# Patient Record
Sex: MALE | Race: WHITE | Employment: FULL TIME | URBAN - METROPOLITAN AREA
[De-identification: names, ages, dates, MRNs, and addresses within clinical notes are randomized per-mention and may not be internally consistent; named-entity substitution may affect disease eponyms.]

---

## 2018-10-09 PROBLEM — E66.01 SEVERE OBESITY (HCC): Status: ACTIVE | Noted: 2018-10-09

## 2021-05-05 PROBLEM — R80.9 MICROALBUMINURIA: Status: ACTIVE | Noted: 2021-05-05

## 2021-11-08 ENCOUNTER — TELEPHONE (OUTPATIENT)
Dept: DIABETES SERVICES | Age: 56
End: 2021-11-08

## 2021-11-18 ENCOUNTER — TELEPHONE (OUTPATIENT)
Dept: DIABETES SERVICES | Age: 56
End: 2021-11-18

## 2021-11-18 NOTE — TELEPHONE ENCOUNTER
Call to patient about Diabetes Education referral.  Private Insurance. Left message to let us know if  Interested, and it is okay we verify insurance and call back with the results.

## 2021-11-18 NOTE — TELEPHONE ENCOUNTER
Patient called back and left a message with Mitchel Cardozo that it is okay to verify insurance. He is interested in Diabetes Education and to call back with the coverage.

## 2021-11-23 ENCOUNTER — TELEPHONE (OUTPATIENT)
Dept: DIABETES SERVICES | Age: 56
End: 2021-11-23

## 2021-11-23 NOTE — TELEPHONE ENCOUNTER
Received insurance verification form Diabetes Education. Called to discuss with patient. Left message to please call us back about Diabetes Education.

## 2021-11-29 ENCOUNTER — TELEPHONE (OUTPATIENT)
Dept: DIABETES SERVICES | Age: 56
End: 2021-11-29

## 2021-11-29 NOTE — TELEPHONE ENCOUNTER
He is interested. He has no time this year for education. He works for [employer].   Okay to verify insurance again in 2022 and call him back in January 2022

## 2022-01-07 ENCOUNTER — TELEPHONE (OUTPATIENT)
Dept: DIABETES SERVICES | Age: 57
End: 2022-01-07

## 2022-01-13 ENCOUNTER — TELEPHONE (OUTPATIENT)
Dept: DIABETES SERVICES | Age: 57
End: 2022-01-13

## 2022-01-13 NOTE — TELEPHONE ENCOUNTER
Second phone call after receiving insurance info for diabetes education. Left message for pt to return call.

## 2022-01-17 ENCOUNTER — TELEPHONE (OUTPATIENT)
Dept: DIABETES SERVICES | Age: 57
End: 2022-01-17

## 2022-01-17 NOTE — TELEPHONE ENCOUNTER
Third attempt to contact pt regarding diabetes referral. Left message asking for a return call. If we don't hear from pt by 1/25/22 we will send a letter. If we don't hear by 2/28/22 we will no longer pursue.

## 2022-03-01 ENCOUNTER — DOCUMENTATION ONLY (OUTPATIENT)
Dept: DIABETES SERVICES | Age: 57
End: 2022-03-01

## 2022-03-01 NOTE — PROGRESS NOTES
Letter sent about Diabetes education on 1-. No response to letter about education. .  Will close Diabetes file and notify referring.

## 2022-03-18 PROBLEM — R80.9 MICROALBUMINURIA: Status: ACTIVE | Noted: 2021-05-05

## 2022-03-19 PROBLEM — E66.01 SEVERE OBESITY (HCC): Status: ACTIVE | Noted: 2018-10-09

## 2022-06-06 ENCOUNTER — OFFICE VISIT (OUTPATIENT)
Dept: ENDOCRINOLOGY | Age: 57
End: 2022-06-06
Payer: COMMERCIAL

## 2022-06-06 VITALS
HEIGHT: 67 IN | DIASTOLIC BLOOD PRESSURE: 80 MMHG | HEART RATE: 78 BPM | WEIGHT: 223.3 LBS | BODY MASS INDEX: 35.05 KG/M2 | OXYGEN SATURATION: 97 % | SYSTOLIC BLOOD PRESSURE: 140 MMHG | TEMPERATURE: 98 F

## 2022-06-06 DIAGNOSIS — E11.42 DIABETIC POLYNEUROPATHY ASSOCIATED WITH TYPE 2 DIABETES MELLITUS (HCC): ICD-10-CM

## 2022-06-06 DIAGNOSIS — E08.319 DIABETIC RETINOPATHY OF BOTH EYES ASSOCIATED WITH DIABETES MELLITUS DUE TO UNDERLYING CONDITION, MACULAR EDEMA PRESENCE UNSPECIFIED, UNSPECIFIED RETINOPATHY SEVERITY (HCC): ICD-10-CM

## 2022-06-06 DIAGNOSIS — Z79.4 TYPE 2 DIABETES MELLITUS WITH HYPERGLYCEMIA, WITH LONG-TERM CURRENT USE OF INSULIN (HCC): Primary | ICD-10-CM

## 2022-06-06 DIAGNOSIS — E78.00 HYPERCHOLESTEROLEMIA: ICD-10-CM

## 2022-06-06 DIAGNOSIS — E11.65 TYPE 2 DIABETES MELLITUS WITH HYPERGLYCEMIA, WITH LONG-TERM CURRENT USE OF INSULIN (HCC): Primary | ICD-10-CM

## 2022-06-06 DIAGNOSIS — I10 ESSENTIAL HYPERTENSION: ICD-10-CM

## 2022-06-06 PROCEDURE — 3017F COLORECTAL CA SCREEN DOC REV: CPT | Performed by: DIETITIAN, REGISTERED

## 2022-06-06 PROCEDURE — 1036F TOBACCO NON-USER: CPT | Performed by: DIETITIAN, REGISTERED

## 2022-06-06 PROCEDURE — G8427 DOCREV CUR MEDS BY ELIG CLIN: HCPCS | Performed by: DIETITIAN, REGISTERED

## 2022-06-06 PROCEDURE — 3046F HEMOGLOBIN A1C LEVEL >9.0%: CPT | Performed by: DIETITIAN, REGISTERED

## 2022-06-06 PROCEDURE — G8417 CALC BMI ABV UP PARAM F/U: HCPCS | Performed by: DIETITIAN, REGISTERED

## 2022-06-06 PROCEDURE — 99205 OFFICE O/P NEW HI 60 MIN: CPT | Performed by: DIETITIAN, REGISTERED

## 2022-06-06 PROCEDURE — 2022F DILAT RTA XM EVC RTNOPTHY: CPT | Performed by: DIETITIAN, REGISTERED

## 2022-06-06 RX ORDER — EMPAGLIFLOZIN AND METFORMIN HYDROCHLORIDE 12.5; 1 MG/1; MG/1
2 TABLET ORAL EVERY MORNING
Qty: 60 TABLET | Refills: 5 | Status: SHIPPED | OUTPATIENT
Start: 2022-06-06 | End: 2022-06-06 | Stop reason: ALTCHOICE

## 2022-06-06 RX ORDER — PEN NEEDLE, DIABETIC 32GX 5/32"
1 NEEDLE, DISPOSABLE MISCELLANEOUS DAILY
Qty: 100 EACH | Refills: 3 | Status: SHIPPED | OUTPATIENT
Start: 2022-06-06 | End: 2022-08-04 | Stop reason: SDUPTHER

## 2022-06-06 RX ORDER — INSULIN GLARGINE 300 U/ML
50 INJECTION, SOLUTION SUBCUTANEOUS EVERY MORNING
Qty: 69 ML | Refills: 5 | Status: SHIPPED | OUTPATIENT
Start: 2022-06-06 | End: 2022-08-04 | Stop reason: SDUPTHER

## 2022-06-06 RX ORDER — DULAGLUTIDE 1.5 MG/.5ML
1.5 INJECTION, SOLUTION SUBCUTANEOUS WEEKLY
Qty: 2 ML | Refills: 3 | Status: SHIPPED | OUTPATIENT
Start: 2022-06-06 | End: 2022-08-04 | Stop reason: ALTCHOICE

## 2022-06-06 RX ORDER — INSULIN LISPRO 100 [IU]/ML
5 INJECTION, SOLUTION INTRAVENOUS; SUBCUTANEOUS 3 TIMES DAILY
Qty: 45 ML | Refills: 5 | Status: SHIPPED | OUTPATIENT
Start: 2022-06-06 | End: 2022-08-04 | Stop reason: SDUPTHER

## 2022-06-06 RX ORDER — INSULIN ASPART 100 [IU]/ML
40 INJECTION, SUSPENSION SUBCUTANEOUS EVERY MORNING
COMMUNITY
End: 2022-06-06 | Stop reason: ALTCHOICE

## 2022-06-06 RX ORDER — EMPAGLIFLOZIN, METFORMIN HYDROCHLORIDE 12.5; 1 MG/1; MG/1
2 TABLET, EXTENDED RELEASE ORAL EVERY MORNING
Qty: 180 TABLET | Refills: 5 | Status: SHIPPED | OUTPATIENT
Start: 2022-06-06 | End: 2022-08-04 | Stop reason: SDUPTHER

## 2022-06-06 RX ORDER — INSULIN GLARGINE 300 U/ML
70 INJECTION, SOLUTION SUBCUTANEOUS EVERY MORNING
COMMUNITY
End: 2022-06-06 | Stop reason: SDUPTHER

## 2022-06-06 RX ORDER — BLOOD SUGAR DIAGNOSTIC
1 STRIP MISCELLANEOUS DAILY
Qty: 200 EACH | Refills: 3 | Status: SHIPPED | OUTPATIENT
Start: 2022-06-06 | End: 2022-08-04 | Stop reason: SDUPTHER

## 2022-06-06 RX ORDER — GLUCAGON INJECTION, SOLUTION 1 MG/.2ML
1 INJECTION, SOLUTION SUBCUTANEOUS AS NEEDED
Qty: 0.4 ML | Refills: 11 | Status: SHIPPED | OUTPATIENT
Start: 2022-06-06

## 2022-06-06 ASSESSMENT — ENCOUNTER SYMPTOMS
ABDOMINAL PAIN: 0
TROUBLE SWALLOWING: 0
VOMITING: 0
CONSTIPATION: 0
NAUSEA: 0
BACK PAIN: 0
COUGH: 0
VOICE CHANGE: 0
SHORTNESS OF BREATH: 0
DIARRHEA: 0
WHEEZING: 0

## 2022-06-06 NOTE — PATIENT INSTRUCTIONS
INSULIN ADMINISTRATION INSTRUCTIONS:    Patient Name:  Ryanne Cooper   YOB: 1965    Provider Name:  RONALD Cruz NP  Today's Date:  06/06/22      Check Blood Sugars four times daily before all meals and bedtime. Trulicity 1.5 mg weekly. LONG-ACTING INSULIN     Toujeo u300 - 50 units daily every morning    The purpose of long-acting insulin is to try to achieve fasting (first thing in the morning) blood glucose in the morning . If fasting blood glucose is persistently above 125, increase the long-acting insulin dose by 2 units every 3 days until fasting blood glucose is persistently . If fasting blood glucose is persistently less than 80, decrease the long-acting insulin dose by 2 units every 3 days until fasting blood glucose is persistently . Leave the dose at whichever dose it takes to keep fasting blood glucose . RAPID-ACTING INSULIN     Humalog 5 units 3 times daily 1st bite. Add correction scale of 1 unit for every 25 points above 150 mg/dL. Be sure to check blood glucose before meals and at bedtime. Based on the blood glucose reading, take the following amount of insulin:      If there are questions, send an electronic [employer] message to GAVIOTA Raymond (for nonurgent questions) or call the office at [phone].     [facility] ENDOCRINOLOGY  [address] [id number]

## 2022-06-06 NOTE — PROGRESS NOTES
[facility] ENDOCRINOLOGY   AND   THYROID [facility]ODULE CL[facility]IC    GAVIOTA Evangelista   [address], [address] [facility]  [place], [place]  Phone [phone]  Facsimile [phone]        Reason for visit:     Catie GALLARDO (1965) is here for new evaluation and treatment of Type 2 Diabetes Mellitus, referred by Liam Maldonado MD, PCP. ASSESSMENT AND PLAN:    1. Type 2 diabetes mellitus with hyperglycemia, with long-term current use of insulin ([facility])  A1c is 7.1%. Glycemic control is suboptimal.  Patient admits sometimes he does not take his insulin. Patient is actually taking Toujeo 50 units once daily. Patient did not bring his glucose readings to the visit today. Patient leads a busy [place] as a [address] [facility], [address] . Patient may benefit from GLP-1 RA to discuss at future visit - with stabilization of retinopathy. --- Stop Novolog 70/30. --- Stop Invokamet. --- Stop Bydureon. --- Switch from invokamet to Synjardy XR 12.5- 1000 mg 2 tablets daily every morning. --- Continue Toujeo 50 units every morning with instructions to titrate by 2 units every 3 days to FBG  mg/dL. --- Start Humalog 5 units AC TID with correction 1 for 25 above 150 mg/dL. --- Start Trulicity 1.5 mg weekly  --- Ordered labs  --- Instructed patient to bring glucose readings next visit to qualify for CGM technology. Most recent renal function is stable. We will repeat CMP at future visit. Patient denies history of diabetic ketoacidosis. We discussed increased risk of urinary tract and genital mycotic infections as well as basic hygiene that may help to prevent infection. Instructed on importance of hydration to prevent DKA. Instructed patient to check blood sugars 4 times daily before meals and at bedtime. Instructed patient on the 15/15 rule for hypoglycemic management. Instructed patient to keep glucose tabs on hand at all times. Instructed patient to use glucagon for blood sugars less than 50 mg/dL. Instructed patient to contact the office if having consistent lows below 70 mg/dL or consistent highs greater than 250 mg/dL. Instructed patient to follow a carb controlled diet. Referred patient to diabetes education. Instructed patient on the importance of at least 20 minutes of daily physical activity such as walking. Instructed patient on the importance of diabetic foot wear and daily foot care. Repeat labs prior to next visit. Follow-up in 1 months.    - TOUJEO SOLOSTAR 300 UNIT/ML SOPN; Inject 50 Units into the skin every morning Titrate by 2 units every 3 days to fasting BG goal of  mg/dL. Max Daily dose 70 units. E11.65. Dispense: 69 mL; Refill: 5  - HUMALOG KWIKPEN 100 UNIT/ML SOPN; Inject 5 Units into the skin 3 times daily Plus correction of 1 for 25 above 150 mg/dL. Max Daily Dose: 45 units. E11.65  Dispense: 45 mL; Refill: 5  - BD PEN NEEDLE INDRA U/F 32G X 4 MM MISC; 1 each by Does not apply route daily  Dispense: 100 each; Refill: 3  - GVOKE HYPOPEN 2-PACK 1 MG/0.2ML SOAJ; Inject 1 mg into the skin as needed (as needed for BG less than 50 mg/dL)  Dispense: 0.4 mL; Refill: 11  - ONETOUCH ULTRA strip; 1 each by In Vitro route daily Check bG 4 times daily E11.65  Dispense: 200 each; Refill: 3  - Comprehensive Metabolic Panel; Future  - C-Peptide; Future  - Zinc Transporter 8 AB; Future  - IA-2 Antibody; Future  - Glutamic Acid Decarboxylase; Future  - SYNJARDY XR 12.5-1000 MG TB24; Take 2 tablets by mouth every morning  Dispense: 180 tablet; Refill: 5  - TRULICITY 1.5 DU/5.9LO SOPN; Inject 1.5 mg into the skin once a week  Dispense: 2 mL; Refill: 3    2. Hypercholesterolemia  Last LDL 42 at goal of less than 70 on rosuvastatin 20 mg daily. 3. Essential hypertension  BP elevated. Patient reports he has white coat syndrome. Patient is taking     4.  Diabetic retinopathy of both eyes associated with diabetes mellitus due to underlying condition, macular edema presence unspecified, unspecified retinopathy severity ([facility]  --- Eye exam is up to date. --- patient may qualify for GLP-1 RA therapy, future discussion. 5. Diabetic polyneuropathy associated with type 2 diabetes mellitus ([facility]  --- Plan to discuss future pharmacotherapy options at next visit. --- Instructed on importance of daily foot care and foot wear. Follow-up and Dispositions    · Return in about 1 month (around 7/6/2022). Tests or Results Reviewed: (see lab dates below in note) HgbA1C, Cr, GFR, Microalbumin/Cr ratio, Lipid Profile, TSH. History of Present Illness:    History given by patient. 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 Date of DM diagnosis: age 37, year 2004      Current Diabetes Medications: Invokamet 150-1000 mg 2 tablets daily with breakfast, Novolog 70/30  Mix 40 units in the morning and 20 units afternoon, Toujeo 70 units in the morning,  Bydureon - 1 year. Medication Failures: metformin - did not help alone. No history of DKA, pancreatitis, and gastroparesis. DFW - left foot center from staph infection and plantar's wart  - now resolved    Current symptoms: See Review of Systems    Nephropathy: Stage 1 Kidney Disease  03/21/2022 Cr 0.69, , microalbumin/Cr ratio <30    Hemoglobin A1c:  07/09/2019 9.9%  10/08/2019  9.5%  06/09/2020 9.6%  01/07/2021 9.3%  04/28/2021 9.9%  10/27/2021 10%  03/21/2022 10.9%    Lipids:    03/21/2022 TC- 152, LDL- 42, VLDL- 63,  HDL- 47, TG- 439    Thyroid:   03/21/2022 TSH 2.830    Other Labs:    Home blood glucose monitoring frequency: 1 times per day    Blood glucose: by glucometer   fasting 135-180   AC lunch none,    AC supper none,    bedtime none     Hypoglycemia: only if not eating for an extended amount of time. Neuropathy: tingling in feet    Retinopathy: Last eye exam was April 2022 and demonstrated both eyes diabetic retinopathy. Eye care specialist is [facility].     Diet:   Breakfast Oat grain cereal  Lunch: turkey sandwich or hamburger/hot dog  Dinner: hamburger meat or chicken  Drink; water, diet cokes, coffee  ETOH: beer - 6 a month or 1-2 per week. Exercise: gym in neighborhood 2 times a week. Diabetes education: The patient has not received formal diabetes education. Patient has been referred to dietitian and nurse help line -     BP:  BP Readings from Last 3 Encounters:   06/06/22 (!) 140/80   04/04/22 134/68   03/21/22 120/70      Weight Trends: Wt Readings from Last 3 Encounters:   06/06/22 223 lb 4.8 oz (101.3 kg)   04/04/22 229 lb 9.6 oz (104.1 kg)   03/21/22 226 lb (102.5 kg)      Medical/Surgical/Social/Family History: Reviewed in Chart    Medications: Reviewed in chart    Allergies  Erythromycin    Review of Systems   Constitutional: Negative for appetite change, diaphoresis, fatigue and unexpected weight change. HENT: Positive for postnasal drip (causes nausea/ gagging). Negative for trouble swallowing and voice change. Eyes: Positive for visual disturbance (s/p laser surgery in both eyes. Left eye - detached retina - in high school. s/p Left eye cataract removal. Floater in left eye). Respiratory: Negative for cough, shortness of breath and wheezing. Cardiovascular: Negative for chest pain, palpitations and leg swelling. Gastrointestinal: Negative for abdominal pain, constipation, diarrhea, nausea and vomiting. Endocrine: Negative for cold intolerance, heat intolerance, polydipsia, polyphagia and polyuria. Genitourinary: Negative for difficulty urinating and frequency. Musculoskeletal: Positive for arthralgias (s/p broken bones on left side - previously raced motorcross. ) and myalgias (pulled a tendon in left arm). Negative for back pain. Skin: Negative for pallor. Neurological: Positive for numbness (tingling in feet and cold feet at night). Negative for dizziness, tremors, weakness and headaches. Hematological: Negative for adenopathy. Psychiatric/Behavioral: Negative for dysphoric mood and sleep disturbance (wake 1 time to urinate). The patient is not nervous/anxious. BP (!) 140/80   Pulse 78   Temp 98 °F (36.7 °C) (Tympanic)   Ht 5' 7\" (1.702 m)   Wt 223 lb 4.8 oz (101.3 kg)   SpO2 97%   BMI 34.97 kg/m²     Physical Exam  Constitutional:       General: He is not in acute distress. Appearance: Normal appearance. He is normal weight. He is not ill-appearing. HENT:      Head: Normocephalic. Cardiovascular:      Rate and Rhythm: Normal rate and regular rhythm. Pulses: Normal pulses. Pulmonary:      Effort: No respiratory distress. Breath sounds: Normal breath sounds. No wheezing or rhonchi. Chest:      Chest wall: No tenderness. Abdominal:      General: There is no distension. Palpations: Abdomen is soft. Tenderness: There is no abdominal tenderness. There is no guarding. Musculoskeletal:         General: No swelling, tenderness or signs of injury. Cervical back: Neck supple. No tenderness. Right lower leg: No edema. Left lower leg: No edema. Feet:      Right foot:      Protective Sensation: 10 sites tested. 10 sites sensed. Skin integrity: Dry skin present. No ulcer. Toenail Condition: Right toenails are normal.      Left foot:      Protective Sensation: 10 sites tested. 10 sites sensed. Skin integrity: Dry skin present. No ulcer. Toenail Condition: Left toenails are normal.   Lymphadenopathy:      Cervical: No cervical adenopathy. Skin:     General: Skin is warm and dry. Findings: No erythema or rash. Neurological:      Mental Status: He is alert. Motor: No weakness.    Psychiatric:         Mood and Affect: Mood normal.         Behavior: Behavior normal.         Orders Placed This Encounter   Procedures    Comprehensive Metabolic Panel     Standing Status:   Future     Standing Expiration Date:   6/6/2023    C-Peptide     Standing Status: Future     Standing Expiration Date:   6/6/2023    Zinc Transporter 8 AB     Standing Status:   Future     Standing Expiration Date:   6/6/2023    IA-2 Antibody     Standing Status:   Future     Standing Expiration Date:   6/6/2023    Glutamic Acid Decarboxylase     Standing Status:   Future     Standing Expiration Date:   6/6/2023       Current Outpatient Medications   Medication Sig Dispense Refill    TOUJEO SOLOSTAR 300 UNIT/ML SOPN Inject 50 Units into the skin every morning Titrate by 2 units every 3 days to fasting BG goal of  mg/dL. Max Daily dose 70 units. E11.65. 69 mL 5    HUMALOG KWIKPEN 100 UNIT/ML SOPN Inject 5 Units into the skin 3 times daily Plus correction of 1 for 25 above 150 mg/dL. Max Daily Dose: 45 units. E11.65 45 mL 5    BD PEN NEEDLE INDRA U/F 32G X 4 MM MISC 1 each by Does not apply route daily 100 each 3    GVOKE HYPOPEN 2-PACK 1 MG/0.2ML SOAJ Inject 1 mg into the skin as needed (as needed for BG less than 50 mg/dL) 0.4 mL 11    ONETOUCH ULTRA strip 1 each by In Vitro route daily Check bG 4 times daily E11.65 200 each 3    SYNJARDY XR 12.5-1000 MG TB24 Take 2 tablets by mouth every morning 180 tablet 5    Lancets MISC Check glucose once daily. DX: E11.65      amLODIPine-benazepril (LOTREL) 10-20 MG per capsule Take 1 capsule by mouth daily      vitamin D (CHOLECALCIFEROL) 25 MCG (1000 UT) TABS tablet Take by mouth daily      hydroCHLOROthiazide (HYDRODIURIL) 12.5 MG tablet Take 12.5 mg by mouth daily      levothyroxine (SYNTHROID) 200 MCG tablet Take 200 mcg by mouth every morning (before breakfast)      rosuvastatin (CRESTOR) 20 MG tablet Take 20 mg by mouth      sildenafil (VIAGRA) 100 MG tablet Take 100 mg by mouth as needed       No current facility-administered medications for this visit. GAVIOTA Whitehead          Portions of this note were generated with the assistance of voice recognition software.   As such, some errors in transcription may be present.

## 2022-06-30 DIAGNOSIS — E11.65 TYPE 2 DIABETES MELLITUS WITH HYPERGLYCEMIA, WITH LONG-TERM CURRENT USE OF INSULIN (HCC): ICD-10-CM

## 2022-06-30 DIAGNOSIS — Z79.4 TYPE 2 DIABETES MELLITUS WITH HYPERGLYCEMIA, WITH LONG-TERM CURRENT USE OF INSULIN (HCC): ICD-10-CM

## 2022-07-01 LAB
ALBUMIN SERPL-MCNC: 4.3 G/DL (ref 3.5–5)
ALBUMIN/GLOB SERPL: 1.3 {RATIO} (ref 1.2–3.5)
ALP SERPL-CCNC: 67 U/L (ref 50–136)
ALT SERPL-CCNC: 30 U/L (ref 12–65)
ANION GAP SERPL CALC-SCNC: 6 MMOL/L (ref 7–16)
AST SERPL-CCNC: 12 U/L (ref 15–37)
BILIRUB SERPL-MCNC: 0.3 MG/DL (ref 0.2–1.1)
BUN SERPL-MCNC: 18 MG/DL (ref 6–23)
CALCIUM SERPL-MCNC: 9.3 MG/DL (ref 8.3–10.4)
CHLORIDE SERPL-SCNC: 108 MMOL/L (ref 98–107)
CO2 SERPL-SCNC: 25 MMOL/L (ref 21–32)
CREAT SERPL-MCNC: 0.7 MG/DL (ref 0.8–1.5)
GLOBULIN SER CALC-MCNC: 3.2 G/DL (ref 2.3–3.5)
GLUCOSE SERPL-MCNC: 108 MG/DL (ref 65–100)
POTASSIUM SERPL-SCNC: 4.1 MMOL/L (ref 3.5–5.1)
PROT SERPL-MCNC: 7.5 G/DL (ref 6.3–8.2)
SODIUM SERPL-SCNC: 139 MMOL/L (ref 136–145)

## 2022-07-02 LAB
C PEPTIDE SERPL-MCNC: 1.3 NG/ML (ref 1.1–4.4)
GAD65 AB SER-ACNC: <5 U/ML (ref 0–5)

## 2022-07-08 ENCOUNTER — TELEPHONE (OUTPATIENT)
Dept: ENDOCRINOLOGY | Age: 57
End: 2022-07-08

## 2022-07-08 NOTE — TELEPHONE ENCOUNTER
Pt brought a letter into the office where his pharmacy advised him Gvoke and Synjardy XR will arrive shortly. But the pharmacy stated they have canceled his Invokamet Rx. Pharmacy also stated if pt is not aware of this change or if Prescriber wishes for pt to continue both Synjardy XR and Invokamet, Please call [phone]. After reviewing pt's last Chart note, Invokamet was discontinued. Pharmacy also stated they needed more information about pt's pen needle Rx. Called Pharmacy-spoke toThao, Advised her of the [employer] a verbal for a 90 day supply on the Trulicity, and Pen needles where to be taking 4 times daily, so a verbal change was made there as well. Ha Gutierrez had not more questions and stated pt will get his mail order next week.

## 2022-07-09 LAB — ZNT8 AB: <15 U/ML

## 2022-07-10 LAB — ISLET CELL512 AB SER-ACNC: <7.5 U/ML

## 2022-08-04 ENCOUNTER — OFFICE VISIT (OUTPATIENT)
Dept: ENDOCRINOLOGY | Age: 57
End: 2022-08-04
Payer: COMMERCIAL

## 2022-08-04 VITALS
HEART RATE: 76 BPM | WEIGHT: 227.6 LBS | TEMPERATURE: 98.2 F | DIASTOLIC BLOOD PRESSURE: 82 MMHG | SYSTOLIC BLOOD PRESSURE: 132 MMHG | BODY MASS INDEX: 35.72 KG/M2 | HEIGHT: 67 IN | OXYGEN SATURATION: 100 %

## 2022-08-04 DIAGNOSIS — E11.65 TYPE 2 DIABETES MELLITUS WITH HYPERGLYCEMIA, WITH LONG-TERM CURRENT USE OF INSULIN (HCC): Primary | ICD-10-CM

## 2022-08-04 DIAGNOSIS — Z79.4 TYPE 2 DIABETES MELLITUS WITH HYPERGLYCEMIA, WITH LONG-TERM CURRENT USE OF INSULIN (HCC): Primary | ICD-10-CM

## 2022-08-04 PROCEDURE — 1036F TOBACCO NON-USER: CPT | Performed by: DIETITIAN, REGISTERED

## 2022-08-04 PROCEDURE — G8427 DOCREV CUR MEDS BY ELIG CLIN: HCPCS | Performed by: DIETITIAN, REGISTERED

## 2022-08-04 PROCEDURE — 2022F DILAT RTA XM EVC RTNOPTHY: CPT | Performed by: DIETITIAN, REGISTERED

## 2022-08-04 PROCEDURE — 3046F HEMOGLOBIN A1C LEVEL >9.0%: CPT | Performed by: DIETITIAN, REGISTERED

## 2022-08-04 PROCEDURE — 3017F COLORECTAL CA SCREEN DOC REV: CPT | Performed by: DIETITIAN, REGISTERED

## 2022-08-04 PROCEDURE — 99215 OFFICE O/P EST HI 40 MIN: CPT | Performed by: DIETITIAN, REGISTERED

## 2022-08-04 PROCEDURE — G8417 CALC BMI ABV UP PARAM F/U: HCPCS | Performed by: DIETITIAN, REGISTERED

## 2022-08-04 RX ORDER — TIRZEPATIDE 2.5 MG/.5ML
2.5 INJECTION, SOLUTION SUBCUTANEOUS WEEKLY
Qty: 2 ML | Refills: 1
Start: 2022-08-04

## 2022-08-04 RX ORDER — CANAGLIFLOZIN AND METFORMIN HYDROCHLORIDE 150; 1000 MG/1; MG/1
TABLET, FILM COATED ORAL
COMMUNITY
Start: 2022-06-21 | End: 2022-08-04 | Stop reason: ALTCHOICE

## 2022-08-04 RX ORDER — PEN NEEDLE, DIABETIC 32GX 5/32"
1 NEEDLE, DISPOSABLE MISCELLANEOUS DAILY
Qty: 100 EACH | Refills: 3 | Status: SHIPPED | OUTPATIENT
Start: 2022-08-04

## 2022-08-04 RX ORDER — BLOOD-GLUCOSE SENSOR
EACH MISCELLANEOUS
Qty: 9 EACH | Refills: 3 | Status: SHIPPED | OUTPATIENT
Start: 2022-08-04

## 2022-08-04 RX ORDER — BLOOD SUGAR DIAGNOSTIC
1 STRIP MISCELLANEOUS DAILY
Qty: 200 EACH | Refills: 3 | Status: SHIPPED | OUTPATIENT
Start: 2022-08-04

## 2022-08-04 RX ORDER — EMPAGLIFLOZIN, METFORMIN HYDROCHLORIDE 12.5; 1 MG/1; MG/1
2 TABLET, EXTENDED RELEASE ORAL EVERY MORNING
Qty: 180 TABLET | Refills: 5 | Status: SHIPPED | OUTPATIENT
Start: 2022-08-04

## 2022-08-04 RX ORDER — INSULIN LISPRO 100 [IU]/ML
8 INJECTION, SOLUTION INTRAVENOUS; SUBCUTANEOUS 3 TIMES DAILY
Qty: 45 ML | Refills: 5 | Status: SHIPPED | OUTPATIENT
Start: 2022-08-04

## 2022-08-04 RX ORDER — INSULIN GLARGINE 300 U/ML
60 INJECTION, SOLUTION SUBCUTANEOUS EVERY MORNING
Qty: 69 ML | Refills: 5 | Status: SHIPPED | OUTPATIENT
Start: 2022-08-04

## 2022-08-04 RX ORDER — LANCETS 30 GAUGE
EACH MISCELLANEOUS
Qty: 400 EACH | Refills: 3 | Status: SHIPPED | OUTPATIENT
Start: 2022-08-04

## 2022-08-04 RX ORDER — BLOOD-GLUCOSE TRANSMITTER
EACH MISCELLANEOUS
Qty: 1 EACH | Refills: 3 | Status: SHIPPED | OUTPATIENT
Start: 2022-08-04

## 2022-08-04 ASSESSMENT — ENCOUNTER SYMPTOMS
VOMITING: 0
TROUBLE SWALLOWING: 0
NAUSEA: 0
WHEEZING: 0
BACK PAIN: 0
DIARRHEA: 0
COUGH: 0
ABDOMINAL PAIN: 0
VOICE CHANGE: 0
CONSTIPATION: 0
SHORTNESS OF BREATH: 0

## 2022-08-04 NOTE — PROGRESS NOTES
[facility] ENDOCRINOLOGY   AND   THYROID NODULE CLINIC    GAVIOTA Salmeron  [address], [address] [facility], [address] [place] [address] Sariah  Phone [phone]  Facsimile [phone]      Reason for visit: Janna GALLARDO (1965) is here for follow up of Type 2 Diabetes Mellitus. ASSESSMENT AND PLAN:    The beneficiary requires a therapeutic CGM for treatment and management of diabetes mellitus. The beneficiary has been using a home blood glucose monitor and performing (1 or more times a day) blood glucose testing. The beneficiary is insulin treated with 3 or more daily injections of insulin or a continuous subcutaneous insulin infusion pump. The beneficiary's insulin treatment regimen requires frequent adjustments by the beneficiary on the basis of therapeutic CGM testing results. 1. Type 2 diabetes mellitus with hyperglycemia, with long-term current use of insulin ([facility])  A1c is 9.5% and trending down. Patient checks blood sugars 1 time a day in the morning fasting. Patient tolerates Trulicity 1.5 mg but reports he wants to lose additional weight. Recommend Mounjaro over Trulicity due to greater weight reduction. Patient tolerates Synjardy daily at max dose with no side effects. Sometimes patient forgets to take his basal insulin on the weekends. Patient has been only giving Humalog 10 units twice daily at times not associated with eating a meal.  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 This can make insulin delivery more challenging. He could benefit from diabetes education. However he says he is unable to attend this year due to his work schedule. --- Next visit will be a phone call visit due to patient's extensive work schedule. --- Continue Toujeo 60 units every morning with instructions to titrate by 2 units every 3 days fasting blood sugar goal of 80-1 25.  --- Continue Synjardy XR 12.5-1000 mg 2 tablets daily.   --- Increase Humalog 8 units AC 3 times daily with correction 1 for 25 above 150.  --- Start Mounjaro 2.5 mg weekly. 1 month sample provided  --- Check blood sugars 4 times daily before meals and bedtime. --- Dexcom G6 ordered    - AMB POC HEMOGLOBIN A1C  - ONETOUCH ULTRA strip; 1 each by In Vitro route daily Check bG 4 times daily E11.65  Dispense: 200 each; Refill: 3  - Lancets MISC; Check glucose once daily. DX: E11.65  Dispense: 400 each; Refill: 3  - TOUJEO SOLOSTAR 300 UNIT/ML SOPN; Inject 60 Units into the skin every morning Titrate by 2 units every 3 days to fasting BG goal of  mg/dL. Max Daily dose 70 units. E11.65. Dispense: 69 mL; Refill: 5  - Tirzepatide (MOUNJARO) 2.5 MG/0.5ML SOPN SC injection; Inject 0.5 mLs into the skin once a week  Dispense: 2 mL; Refill: 1  - SYNJARDY XR 12.5-1000 MG TB24; Take 2 tablets by mouth every morning  Dispense: 180 tablet; Refill: 5  - HUMALOG KWIKPEN 100 UNIT/ML SOPN; Inject 8 Units into the skin in the morning and 8 Units at noon and 8 Units before bedtime. Plus correction of 1 for 25 above 150 mg/dL. Max Daily Dose: 45 units. E11.65. Dispense: 45 mL; Refill: 5  - BD PEN NEEDLE INDRA U/F 32G X 4 MM MISC; 1 each by Does not apply route daily  Dispense: 100 each; Refill: 3  - Continuous Blood Gluc Sensor (DEXCOM G6 SENSOR) MISC; Change every 10 days as directed. Dispense: 9 each; Refill: 3  - Continuous Blood Gluc Transmit (DEXCOM G6 TRANSMITTER) MISC; Change every 90 days as directed. Dispense: 1 each; Refill: 3  - Comprehensive Metabolic Panel; Future  - Vitamin D 25 Hydroxy; Future  - Hemoglobin A1C; Future  - [facility]    2. Hypercholesterolemia  Last LDL 42 at goal of less than 70 on rosuvastatin 20 mg daily. 3. Essential hypertension  BP controlled. --- Amlodipine benazepril 10-20 mg capsule 1 daily. --- HCTZ 12.5 mg daily. BP Readings from Last 3 Encounters:   08/04/22 132/82   06/06/22 (!) 140/80   04/04/22 134/68     4.  Diabetic retinopathy of both eyes associated with diabetes mellitus due to underlying condition, macular edema presence unspecified, unspecified retinopathy severity ([facility]  --- Eye exam is up to date. 5. Diabetic polyneuropathy associated with type 2 diabetes mellitus ([facility]  --- Instructed on importance of daily foot care and foot wear. Follow-up and Dispositions    Return in about 3 months (around 11/4/2022) for phone call visit. Tests or Results Reviewed: (see lab dates below in note) HgbA1C, Cr, GFR, Microalbumin/Cr ratio, Lipid Profile, TSH. History of Present Illness:     History given by patient. Weight loss 5 years ago of 40 lbs used to weigh 265 lbs. 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 Date of DM diagnosis: age 37, year 2004       Current Diabetes Medications: Trulicity 1.5 mg weekly, Toujeo 59 units in the morning, Synjardy 12.5 -1000 XR 2 tablets daily, humlog 10 units 2 times daily     Medication Failures: metformin - did not help alone. No history of DKA, pancreatitis, and gastroparesis.       DFW - left foot center from staph infection and plantar's wart  - now resolved     Current symptoms: See Review of Systems     Nephropathy: Stage 1 Kidney Disease  03/21/2022      Cr 0.69, , microalbumin/Cr ratio <30  06/30/2022 Cr 0.70, GFR >60, microalbumin/Cr ratio none     Hemoglobin A1c:  07/09/2019      9.9%  10/08/2019      9.5%  06/09/2020      9.6%  01/07/2021      9.3%  04/28/2021      9.9%  10/27/2021      10%  03/21/2022      10.9%  08/04/2022 9.5%     Lipids:    03/21/2022 TC- 152, LDL- 42, VLDL- 63,  HDL- 47, TG- 439     Thyroid:   03/21/2022      TSH     2.830     Other Labs:     GALDINO-65:  06/30/2022 <5.0 (negative)    IA-2:  06/30/2022 <7.5 (negative)    ZNT-8:   06/30/2022 <15 (negative)    C-Peptide:   06/30/2022 1.3 (1.1-4.4)    Fasting Glucose:  06/30/2022 108    Home blood glucose monitoring frequency: 1 times per day     Blood glucose: by glucometer              fasting 135-180              AC lunch none,              AC supper none,              bedtime none     Hypoglycemia: only if not eating for an extended amount of time. Neuropathy: tingling in feet     Retinopathy: Last eye exam was April 2022 and demonstrated both eyes diabetic retinopathy. [facility] care specialist is [facility]. Diet:   Breakfast Oat grain cereal  Lunch: turkey sandwich or hamburger/hot dog  Dinner: hamburger meat or chicken  Drink; water, diet cokes, coffee  ETOH: beer - 6 a month or 1-2 per week. Exercise: gym in neighborhood 2 times a week. Diabetes education: The patient has not received formal diabetes education. Patient has been referred to dietitian and nurse help line -      BP:      BP Readings from Last 3 Encounters:   06/06/22 (!) 140/80   04/04/22 134/68   03/21/22 120/70      Weight Trends: Wt Readings from Last 3 Encounters:   06/06/22 223 lb 4.8 oz (101.3 kg)   04/04/22 229 lb 9.6 oz (104.1 kg)   03/21/22 226 lb (102.5 kg)      Medical/Surgical/Social/Family History: Reviewed in Chart     Medications: Reviewed in chart     Allergies  Erythromycin    Tests or Results Reviewed: (see lab dates below in note) HgbA1C, Cr, GFR, Microalbumin/Cr ratio, Lipid Profile, TSH. History of Present Illness:       /82   Pulse 76   Temp 98.2 °F (36.8 °C) (Tympanic)   Ht 5' 7\" (1.702 m)   Wt 227 lb 9.6 oz (103.2 kg)   SpO2 100%   BMI 35.65 kg/m²     Weight Trends: Wt Readings from Last 3 Encounters:   08/04/22 227 lb 9.6 oz (103.2 kg)   06/06/22 223 lb 4.8 oz (101.3 kg)   04/04/22 229 lb 9.6 oz (104.1 kg)       Allergies and Medications: Reviewed in Chart    Review of Systems   Constitutional:  Negative for appetite change, diaphoresis, fatigue and unexpected weight change. HENT:  Negative for trouble swallowing and voice change. Eyes:  Negative for visual disturbance. Respiratory:  Negative for cough, shortness of breath and wheezing. Cardiovascular:  Negative for chest pain, palpitations and leg swelling. Gastrointestinal:  Negative for abdominal pain, constipation, diarrhea, nausea and vomiting. Endocrine: Positive for cold intolerance. Negative for heat intolerance, polydipsia, polyphagia and polyuria. Genitourinary:  Negative for difficulty urinating and frequency. Musculoskeletal:  Negative for arthralgias, back pain and myalgias. Skin:  Negative for pallor. Neurological:  Negative for dizziness, tremors, weakness, numbness and headaches. Hematological:  Negative for adenopathy. Psychiatric/Behavioral:  Negative for dysphoric mood and sleep disturbance. The patient is not nervous/anxious. Physical Exam  Constitutional:       General: He is not in acute distress. Appearance: Normal appearance. He is normal weight. He is not ill-appearing. HENT:      Head: Normocephalic. Cardiovascular:      Rate and Rhythm: Normal rate and regular rhythm. Pulses: Normal pulses. Pulmonary:      Effort: No respiratory distress. Breath sounds: Normal breath sounds. No wheezing or rhonchi. Chest:      Chest wall: No tenderness. Abdominal:      General: There is no distension. Palpations: Abdomen is soft. Tenderness: There is no abdominal tenderness. There is no guarding. Musculoskeletal:         General: No swelling, tenderness or signs of injury. Cervical back: Neck supple. No tenderness. Right lower leg: No edema. Left lower leg: No edema. Feet:      Right foot:      Skin integrity: Skin integrity normal. No ulcer. Left foot:      Skin integrity: Skin integrity normal. No ulcer. Lymphadenopathy:      Cervical: No cervical adenopathy. Skin:     General: Skin is warm and dry. Findings: No erythema or rash. Neurological:      Mental Status: He is alert. Motor: No weakness.    Psychiatric:         Mood and Affect: Mood normal.         Behavior: Behavior normal. Orders Placed This Encounter   Procedures    Comprehensive Metabolic Panel     Standing Status:   Future     Standing Expiration Date:   8/4/2023    Vitamin D 25 Hydroxy     Standing Status:   Future     Standing Expiration Date:   8/4/2023    Hemoglobin A1C     Standing Status:   Future     Standing Expiration Date:   8/4/2023    [facility] Diabetic Treatment     Referral Priority:   Routine     Referral Type:   Eval and Treat     Referral Reason:   Specialty Services Required     Number of Visits Requested:   1    AMB POC HEMOGLOBIN A1C       Current Outpatient Medications   Medication Sig Dispense Refill    ONETOUCH ULTRA strip 1 each by In Vitro route daily Check bG 4 times daily E11.65 200 each 3    Lancets MISC Check glucose once daily. DX: E11.65 400 each 3    TOUJEO SOLOSTAR 300 UNIT/ML SOPN Inject 60 Units into the skin every morning Titrate by 2 units every 3 days to fasting BG goal of  mg/dL. Max Daily dose 70 units. E11.65. 69 mL 5    Tirzepatide (MOUNJARO) 2.5 MG/0.5ML SOPN SC injection Inject 0.5 mLs into the skin once a week 2 mL 1    SYNJARDY XR 12.5-1000 MG TB24 Take 2 tablets by mouth every morning 180 tablet 5    HUMALOG KWIKPEN 100 UNIT/ML SOPN Inject 8 Units into the skin in the morning and 8 Units at noon and 8 Units before bedtime. Plus correction of 1 for 25 above 150 mg/dL. Max Daily Dose: 45 units. E11.65. 45 mL 5    BD PEN NEEDLE INDRA U/F 32G X 4 MM MISC 1 each by Does not apply route daily 100 each 3    Continuous Blood Gluc Sensor (DEXCOM G6 SENSOR) MISC Change every 10 days as directed. 9 each 3    Continuous Blood Gluc Transmit (DEXCOM G6 TRANSMITTER) MISC Change every 90 days as directed.  1 each 3    GVOKE HYPOPEN 2-PACK 1 MG/0.2ML SOAJ Inject 1 mg into the skin as needed (as needed for BG less than 50 mg/dL) 0.4 mL 11    amLODIPine-benazepril (LOTREL) 10-20 MG per capsule Take 1 capsule by mouth daily      vitamin D (CHOLECALCIFEROL) 25 MCG (1000 UT) TABS tablet Take by mouth daily      hydroCHLOROthiazide (HYDRODIURIL) 12.5 MG tablet Take 12.5 mg by mouth daily      levothyroxine (SYNTHROID) 200 MCG tablet Take 200 mcg by mouth every morning (before breakfast)      rosuvastatin (CRESTOR) 20 MG tablet Take 20 mg by mouth      sildenafil (VIAGRA) 100 MG tablet Take 100 mg by mouth as needed       No current facility-administered medications for this visit. RONALD Horn NP    On this date 8/4/2022 I have spent 45 minutes reviewing previous notes, test results and face to face with the patient discussing the diagnosis and importance of compliance with the treatment plan as well as documenting on the day of the visit. Portions of this note were generated with the assistance of voice recognition software. As such, some errors in transcription may be present.

## 2022-08-05 ENCOUNTER — TELEPHONE (OUTPATIENT)
Dept: ENDOCRINOLOGY | Age: 57
End: 2022-08-05

## 2022-08-05 ENCOUNTER — TELEPHONE (OUTPATIENT)
Dept: DIABETES SERVICES | Age: 57
End: 2022-08-05

## 2022-08-05 NOTE — TELEPHONE ENCOUNTER
PA for Dexcom has been completed via [employer].  Currently waiting for insurance to deny/approve request.

## 2022-08-05 NOTE — TELEPHONE ENCOUNTER
----- Message from [address], APRN - NP sent at 8/4/2022  5:39 PM EDT -----  Please assist with dexcom PA

## 2022-08-16 ENCOUNTER — TELEPHONE (OUTPATIENT)
Dept: DIABETES SERVICES | Age: 57
End: 2022-08-16

## 2022-08-24 ENCOUNTER — TELEPHONE (OUTPATIENT)
Dept: DIABETES SERVICES | Age: 57
End: 2022-08-24

## 2022-08-24 NOTE — TELEPHONE ENCOUNTER
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 He left call back number. Called him back and scheduled. Asked him to shave stomach if hairy due to placement of Dexcom sensor. He says going to use Android phone. He is going to make sure reina downloads to his phone. If it does not he will call me back. Instructed if does not will have to use a  and they did not order him a .

## 2022-08-26 ENCOUNTER — HOSPITAL ENCOUNTER (OUTPATIENT)
Dept: DIABETES SERVICES | Age: 57
Setting detail: RECURRING SERIES
Discharge: HOME | End: 2022-08-29
Payer: COMMERCIAL

## 2022-08-26 PROCEDURE — 95249 CONT GLUC MNTR PT PROV EQP: CPT

## 2022-08-26 NOTE — PROGRESS NOTES
This is a one on one appointment. Due to being during [id number] public health emergency, social distancing and mandatory precautions are in place and utilized. Pt seen today for initial insertion of Dexcom G6 system. Pt instructed on overview of continuous glucose monitoring (CGM) device and use. Instructed on sensor, transmitter and . Android will be used. Pt instructed on download of Dexcom reina to phone. Clarity reina instructed for download as well. Time and date and transmitter ID verified. Instructed on troubleshooting, alerts, alarms, calibration, communication between sensor and , insertion of sensor and transmitter, starting sensor session, start up calibration, ending sensor session, removing sensor pod and transmitter and site rotation. Reviewed CGM guidelines and restrictions on use including no MRI, CT scans, or diathermy electrical treatments, bug spray, sun tanning lotions medications such as-Tylenol greater than standard dose ( 1 gram or 1000 mg every 6 hours) can make Dexcom G6 sensor readings look higher than they really are, or Hydroxyurea used for some cancers or sickle cell anemia - Your sensor readings will be higher than your actual glucose. Inject insulin at least three inches from sensor site. Instructed only need to calibrate Dexcom G6, if blood glucose is 20 points off when blood glucose is under 70 mg/dl, or if blood sugar is 20% off when over 70 mg/dl. All questions and concerns addressed. Provided Dexcom technical support phone number. Medication Reconciliation. No surgery's or procedures.

## 2022-11-07 DIAGNOSIS — E11.65 TYPE 2 DIABETES MELLITUS WITH HYPERGLYCEMIA, WITH LONG-TERM CURRENT USE OF INSULIN (HCC): ICD-10-CM

## 2022-11-07 DIAGNOSIS — Z79.4 TYPE 2 DIABETES MELLITUS WITH HYPERGLYCEMIA, WITH LONG-TERM CURRENT USE OF INSULIN (HCC): ICD-10-CM

## 2022-11-07 LAB
25(OH)D3 SERPL-MCNC: 23.8 NG/ML (ref 30–100)
ALBUMIN SERPL-MCNC: 4.3 G/DL (ref 3.5–5)
ALBUMIN/GLOB SERPL: 1.3 {RATIO} (ref 0.4–1.6)
ALP SERPL-CCNC: 66 U/L (ref 50–136)
ALT SERPL-CCNC: 32 U/L (ref 12–65)
ANION GAP SERPL CALC-SCNC: 7 MMOL/L (ref 2–11)
AST SERPL-CCNC: 10 U/L (ref 15–37)
BILIRUB SERPL-MCNC: 0.4 MG/DL (ref 0.2–1.1)
BUN SERPL-MCNC: 10 MG/DL (ref 6–23)
CALCIUM SERPL-MCNC: 9.5 MG/DL (ref 8.3–10.4)
CHLORIDE SERPL-SCNC: 107 MMOL/L (ref 101–110)
CO2 SERPL-SCNC: 26 MMOL/L (ref 21–32)
CREAT SERPL-MCNC: 0.7 MG/DL (ref 0.8–1.5)
GLOBULIN SER CALC-MCNC: 3.2 G/DL (ref 2.8–4.5)
GLUCOSE SERPL-MCNC: 159 MG/DL (ref 65–100)
POTASSIUM SERPL-SCNC: 4.1 MMOL/L (ref 3.5–5.1)
PROT SERPL-MCNC: 7.5 G/DL (ref 6.3–8.2)
SODIUM SERPL-SCNC: 140 MMOL/L (ref 133–143)

## 2022-11-08 LAB
EST. AVERAGE GLUCOSE BLD GHB EST-MCNC: 203 MG/DL
HBA1C MFR BLD: 8.7 % (ref 4.8–5.6)

## 2022-11-11 ENCOUNTER — SCHEDULED TELEPHONE ENCOUNTER (OUTPATIENT)
Dept: ENDOCRINOLOGY | Age: 57
End: 2022-11-11
Payer: COMMERCIAL

## 2022-11-11 DIAGNOSIS — E55.9 VITAMIN D DEFICIENCY: ICD-10-CM

## 2022-11-11 DIAGNOSIS — I10 ESSENTIAL HYPERTENSION: ICD-10-CM

## 2022-11-11 DIAGNOSIS — Z79.4 TYPE 2 DIABETES MELLITUS WITH HYPERGLYCEMIA, WITH LONG-TERM CURRENT USE OF INSULIN (HCC): Primary | ICD-10-CM

## 2022-11-11 DIAGNOSIS — N52.9 ERECTILE DYSFUNCTION, UNSPECIFIED ERECTILE DYSFUNCTION TYPE: ICD-10-CM

## 2022-11-11 DIAGNOSIS — E11.65 TYPE 2 DIABETES MELLITUS WITH HYPERGLYCEMIA, WITH LONG-TERM CURRENT USE OF INSULIN (HCC): Primary | ICD-10-CM

## 2022-11-11 DIAGNOSIS — E03.9 HYPOTHYROIDISM, UNSPECIFIED TYPE: ICD-10-CM

## 2022-11-11 DIAGNOSIS — E78.00 HYPERCHOLESTEROLEMIA: ICD-10-CM

## 2022-11-11 PROCEDURE — 99442 PR PHYS/QHP TELEPHONE EVALUATION 11-20 MIN: CPT | Performed by: DIETITIAN, REGISTERED

## 2022-11-11 RX ORDER — LEVOTHYROXINE SODIUM 0.2 MG/1
200 TABLET ORAL
Qty: 30 TABLET | Refills: 11 | Status: SHIPPED | OUTPATIENT
Start: 2022-11-11

## 2022-11-11 RX ORDER — EMPAGLIFLOZIN, METFORMIN HYDROCHLORIDE 12.5; 1 MG/1; MG/1
2 TABLET, EXTENDED RELEASE ORAL EVERY MORNING
Qty: 180 TABLET | Refills: 11 | Status: SHIPPED | OUTPATIENT
Start: 2022-11-11

## 2022-11-11 RX ORDER — HYDROCHLOROTHIAZIDE 12.5 MG/1
12.5 TABLET ORAL DAILY
Qty: 30 TABLET | Refills: 11 | Status: SHIPPED | OUTPATIENT
Start: 2022-11-11

## 2022-11-11 RX ORDER — TIRZEPATIDE 5 MG/.5ML
5 INJECTION, SOLUTION SUBCUTANEOUS WEEKLY
Qty: 2 ML | Refills: 11 | Status: SHIPPED | OUTPATIENT
Start: 2022-11-11

## 2022-11-11 RX ORDER — SILDENAFIL 100 MG/1
100 TABLET, FILM COATED ORAL PRN
Qty: 30 TABLET | Refills: 11 | Status: SHIPPED | OUTPATIENT
Start: 2022-11-11

## 2022-11-11 RX ORDER — INSULIN LISPRO 100 [IU]/ML
5 INJECTION, SOLUTION INTRAVENOUS; SUBCUTANEOUS 3 TIMES DAILY
Qty: 15 ML | Refills: 11 | Status: SHIPPED | OUTPATIENT
Start: 2022-11-11

## 2022-11-11 RX ORDER — GLUCAGON INJECTION, SOLUTION 1 MG/.2ML
1 INJECTION, SOLUTION SUBCUTANEOUS AS NEEDED
Qty: 0.4 ML | Refills: 11 | Status: SHIPPED | OUTPATIENT
Start: 2022-11-11

## 2022-11-11 RX ORDER — INSULIN GLARGINE 300 U/ML
56 INJECTION, SOLUTION SUBCUTANEOUS EVERY MORNING
Qty: 30 ML | Refills: 11 | Status: SHIPPED | OUTPATIENT
Start: 2022-11-11

## 2022-11-11 RX ORDER — PEN NEEDLE, DIABETIC 32GX 5/32"
1 NEEDLE, DISPOSABLE MISCELLANEOUS DAILY
Qty: 200 EACH | Refills: 11 | Status: SHIPPED | OUTPATIENT
Start: 2022-11-11

## 2022-11-11 RX ORDER — BLOOD SUGAR DIAGNOSTIC
STRIP MISCELLANEOUS
Qty: 200 EACH | Refills: 11 | Status: SHIPPED | OUTPATIENT
Start: 2022-11-11

## 2022-11-11 RX ORDER — ROSUVASTATIN CALCIUM 20 MG/1
20 TABLET, COATED ORAL DAILY
Qty: 30 TABLET | Refills: 11 | Status: SHIPPED | OUTPATIENT
Start: 2022-11-11

## 2022-11-11 RX ORDER — AMLODIPINE BESYLATE AND BENAZEPRIL HYDROCHLORIDE 10; 20 MG/1; MG/1
1 CAPSULE ORAL DAILY
Qty: 30 CAPSULE | Refills: 11 | Status: SHIPPED | OUTPATIENT
Start: 2022-11-11

## 2022-11-11 RX ORDER — BLOOD-GLUCOSE METER
EACH MISCELLANEOUS
Qty: 1 KIT | Refills: 11 | Status: SHIPPED | OUTPATIENT
Start: 2022-11-11

## 2022-11-11 NOTE — PROGRESS NOTES
Juan Negron is a 59 y.o. male evaluated via audio-only technology on 11/11/2022 for No chief complaint on file. Assessment & Plan:     1. Type 2 diabetes mellitus with hyperglycemia, with long-term current use of insulin ([facility])  A1C is 8.7%. Glycemic control suboptimal. Tolerating Mounjaro 2.5 mg weekly. After 2 more weeks advance to [facility] 5 mg weekly. -- On week starting Mounjaro 5 mg - reduce Toujeo to 56 units daily with titration instructions below. --- Patient will get apple phone to read Dexcom G6. Declines Dexcom [place]. --- F/u in 1 month.  - ONETOUCH VERIO strip; Check BG 4 times daily before meals and at bedtime. E11.65  Dispense: 200 each; Refill: 11  - Blood Glucose Monitoring Suppl (Rhonda Bazzi) w/Device KIT; Check BG 4 times daily before meals and at bedtime. Dispense: 1 kit; Refill: 11  - SYNJARDY XR 12.5-1000 MG TB24; Take 2 tablets by mouth every morning  Dispense: 180 tablet; Refill: 11  - HUMALOG KWIKPEN 100 UNIT/ML SOPN; Inject 5 Units into the skin 3 times daily Take Humalog 15 minutes before meals. Plus correction of 1 for 50 above 150 mg/dL. Max Daily Dose: 45 units. E11.65  Dispense: 15 mL; Refill: 11  - MOUNJARO 5 MG/0.5ML SOPN SC injection; Inject 0.5 mLs into the skin once a week  Dispense: 2 mL; Refill: 11  - TOUJEO SOLOSTAR 300 UNIT/ML concentrated injection pen; Inject 56 Units into the skin every morning Titrate by 2 units every 3 days to fasting BG goal of  mg/dL. Max Daily dose 70 units. E11.65. Dispense: 30 mL; Refill: 11  - BD PEN NEEDLE INDRA U/F 32G X 4 MM MISC; 1 each by Does not apply route daily  Dispense: 200 each; Refill: 11  - GVOKE HYPOPEN 2-PACK 1 MG/0.2ML SOAJ; Inject 1 mg into the skin as needed (as needed for BG less than 50 mg/dL)  Dispense: 0.4 mL; Refill: 11    2. Hypercholesterolemia  Last LDL 42 at goal of less than 70 on rosuvastatin 20 mg daily. - rosuvastatin (CRESTOR) 20 MG tablet;  Take 1 tablet by mouth daily  Dispense: 30 tablet; Refill: 11    3. Essential hypertension  BP controlled. Refills sent per patient request.      BP Readings from Last 3 Encounters:   08/04/22 132/82   06/06/22 (!) 140/80   04/04/22 134/68   - amLODIPine-benazepril (LOTREL) 10-20 MG per capsule; Take 1 capsule by mouth daily  Dispense: 30 capsule; Refill: 11  - hydroCHLOROthiazide (HYDRODIURIL) 12.5 MG tablet; Take 1 tablet by mouth daily  Dispense: 30 tablet; Refill: 11    4. Hypothyroidism, unspecified type  -- needs repeat labs- he will come in January 2023 for f/u. - levothyroxine (SYNTHROID) 200 MCG tablet; Take 1 tablet by mouth every morning (before breakfast)  Dispense: 30 tablet; Refill: 11    5. Erectile dysfunction, unspecified erectile dysfunction type  - sildenafil (VIAGRA) 100 MG tablet; Take 1 tablet by mouth as needed for Erectile Dysfunction  Dispense: 30 tablet; Refill: 11    6. Vitamin D deficiency  --- Recommend a Vitamin D3 over the counter supplement. 7. Diabetic retinopathy of both eyes associated with diabetes mellitus due to underlying condition, macular edema presence unspecified, unspecified retinopathy severity ([address]  --- Eye exam is up to date. 8. Diabetic polyneuropathy associated with type 2 diabetes mellitus ([address]  --- Discuss at future visit. No follow-ups on file. Subjective:     Busy with work - difficulty taking injections daily. Home blood glucose monitoring frequency: 2 times per day    Blood glucose: by verbal review   fasting 168,    AC lunch ?,    AC supper 178,    bedtime ? Tests or Results Reviewed: (see lab dates below in note) HgbA1C, Cr, GFR, Microalbumin/Cr ratio, Lipid Profile, TSH. History of Present Illness:     History given by patient. Weight loss 5 years ago of 40 lbs used to weigh 265 lbs. 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      Date of DM diagnosis: age 37, year 2004       Current Diabetes Medications: Trulicity 1.5 mg weekly, Toujeo 59 units in the morning, Synjardy 12.5 -1000 XR 2 tablets daily, humlog 10 units 2 times daily     Medication Failures: metformin - did not help alone. No history of DKA, pancreatitis, and gastroparesis. DFW - left foot center from staph infection and plantar's wart  - now resolved     Current symptoms: See Review of Systems     Nephropathy: Stage 1 Kidney Disease  03/21/2022      Cr 0.69, , microalbumin/Cr ratio <30  06/30/2022      Cr 0.70, GFR >60, microalbumin/Cr ratio none     Hemoglobin A1c:  07/09/2019      9.9%  10/08/2019      9.5%  06/09/2020      9.6%  01/07/2021      9.3%  04/28/2021      9.9%  10/27/2021      10%  03/21/2022      10.9%  08/04/2022      9.5%     Lipids:    03/21/2022 TC- 152, LDL- 42, VLDL- 63,  HDL- 47, TG- 439     Thyroid:   03/21/2022      TSH     2.830     Other Labs:     GALDINO-65:  06/30/2022      <5.0 (negative)     IA-2:  06/30/2022      <7.5 (negative)     ZNT-8:   06/30/2022      <15 (negative)     C-Peptide:   06/30/2022      1.3 (1.1-4.4)     Fasting Glucose:  06/30/2022      108     Home blood glucose monitoring frequency: 1 times per day     Blood glucose: by glucometer              fasting 135-180              AC lunch none,              AC supper none,              bedtime none     Hypoglycemia: only if not eating for an extended amount of time. Neuropathy: tingling in feet     Retinopathy: Last eye exam was April 2022 and demonstrated both eyes diabetic retinopathy. Eye care specialist is [facility]. Diet:   Breakfast Oat grain cereal  Lunch: turkey sandwich or hamburger/hot dog  Dinner: hamburger meat or chicken  Drink; water, diet cokes, coffee  ETOH: beer - 6 a month or 1-2 per week. Exercise: gym in neighborhood 2 times a week. Diabetes education: The patient has not received formal diabetes education.  Patient has been referred to dietitian and nurse help line -      BP:        BP Readings from Last 3 Encounters: 06/06/22 (!) 140/80   04/04/22 134/68   03/21/22 120/70      Weight Trends: Wt Readings from Last 3 Encounters:   06/06/22 223 lb 4.8 oz (101.3 kg)   04/04/22 229 lb 9.6 oz (104.1 kg)   03/21/22 226 lb (102.5 kg)      Medical/Surgical/Social/Family History: Reviewed in Chart     Medications: Reviewed in chart     Allergies  Erythromycin    Prior to Admission medications    Medication Sig Start Date End Date Taking? Authorizing Provider   [facility] VERIO strip Check BG 4 times daily before meals and at bedtime. E11.65 11/11/22  Yes RONALD Hargrove NP   Blood Glucose Monitoring Suppl (ONETOUCH VERIO) w/Device KIT Check BG 4 times daily before meals and at bedtime. 11/11/22  Yes RONALD Hargrove NP   SYNJARDY XR 12.5-1000 MG TB24 Take 2 tablets by mouth every morning 11/11/22  Yes RONALD Hargrove NP   HUMALOG KWIKPEN 100 UNIT/ML SOPN Inject 5 Units into the skin 3 times daily Take Humalog 15 minutes before meals. Plus correction of 1 for 50 above 150 mg/dL. Max Daily Dose: 45 units. E11.65 11/11/22  Yes RONALD Hargrove NP   MOUNJARO 5 MG/0.5ML SOPN SC injection Inject 0.5 mLs into the skin once a week 11/11/22  Yes RONALD Hargrove NP   TOUJEO SOLOSTAR 300 UNIT/ML concentrated injection pen Inject 56 Units into the skin every morning Titrate by 2 units every 3 days to fasting BG goal of  mg/dL. Max Daily dose 70 units.  E11.65. 11/11/22  Yes RONALD Hargrove NP   amLODIPine-benazepril (LOTREL) 10-20 MG per capsule Take 1 capsule by mouth daily 11/11/22  Yes RONALD Amin NP   rosuvastatin (CRESTOR) 20 MG tablet Take 1 tablet by mouth daily 11/11/22  Yes RONALD Hargrove NP   levothyroxine (SYNTHROID) 200 MCG tablet Take 1 tablet by mouth every morning (before breakfast) 11/11/22  Yes RONALD Betancourt NP   hydroCHLOROthiazide (HYDRODIURIL) 12.5 MG tablet Take 1 tablet by mouth daily 11/11/22  Yes Chi GEORGE Ridenhour, APRN - NP   BD PEN NEEDLE INDRA U/F 32G X 4 MM MISC 1 each by Does not apply route daily 11/11/22  Yes RONALD Franks NP   Dayday Chaya 2-PACK 1 MG/0.2ML SOAJ Inject 1 mg into the skin as needed (as needed for BG less than 50 mg/dL) 11/11/22  Yes RONALD Reis NP   sildenafil (VIAGRA) 100 MG tablet Take 1 tablet by mouth as needed for Erectile Dysfunction 11/11/22  Yes RONALD Reis NP   Lancets MISC Check glucose once daily. DX: E11.65 8/4/22   RONALD Reis NP   Continuous Blood Gluc Sensor (DEXCOM G6 SENSOR) MISC Change every 10 days as directed. 8/4/22   RONALD Reis NP   Continuous Blood Gluc Transmit (DEXCOM G6 TRANSMITTER) MISC Change every 90 days as directed. 8/4/22   RONALD Reis NP   vitamin D (CHOLECALCIFEROL) 25 MCG (1000 UT) TABS tablet Take by mouth daily    Ar Automatic Reconciliation     Past Medical History:   Diagnosis Date    Diabetes ([facility]     type 2 - on oral medication -'s    GERD (gastroesophageal reflux disease)     well controlled    Hypertension     on medication    MIGUEL (obstructive sleep apnea)     on CPAP    Primary localized osteoarthrosis, lower leg 1/8/2010    Tear of medial cartilage or meniscus of knee, current 1/8/2010    Thyroid disease     on medication     Review of Systems    No data recorded    Doris Hill was evaluated through a patient-initiated, synchronous (real-time) audio only encounter. He (or guardian if applicable) is aware that it is a billable service, which includes applicable co-pays, with coverage as determined by his insurance carrier. This visit was conducted with the patient's (and/or Hallie Reyes guardian's) verbal consent. He has not had a related appointment within my department in the past 7 days or scheduled within the next 24 hours. The patient was located in a state where the provider was licensed to provide care.   The patient was located at: Home: [address] [place] [address]  The provider was located at:  Facility (Appt Dept): [address] [place] [address] [place] [address]    Total Time: 21 minutes    RONALD Cage NP

## 2023-03-16 ENCOUNTER — TELEPHONE (OUTPATIENT)
Dept: ENDOCRINOLOGY | Age: 58
End: 2023-03-16

## 2023-08-18 ENCOUNTER — OFFICE VISIT (OUTPATIENT)
Dept: ENDOCRINOLOGY | Age: 58
End: 2023-08-18

## 2023-08-18 VITALS
SYSTOLIC BLOOD PRESSURE: 149 MMHG | DIASTOLIC BLOOD PRESSURE: 79 MMHG | OXYGEN SATURATION: 98 % | HEART RATE: 80 BPM | BODY MASS INDEX: 34.02 KG/M2 | WEIGHT: 217.2 LBS

## 2023-08-18 DIAGNOSIS — E11.59 HYPERTENSION ASSOCIATED WITH TYPE 2 DIABETES MELLITUS (HCC): Chronic | ICD-10-CM

## 2023-08-18 DIAGNOSIS — I15.2 HYPERTENSION ASSOCIATED WITH TYPE 2 DIABETES MELLITUS (HCC): Chronic | ICD-10-CM

## 2023-08-18 DIAGNOSIS — Z79.4 TYPE 2 DIABETES MELLITUS WITH HYPERGLYCEMIA, WITH LONG-TERM CURRENT USE OF INSULIN (HCC): Primary | ICD-10-CM

## 2023-08-18 DIAGNOSIS — E55.9 VITAMIN D DEFICIENCY: ICD-10-CM

## 2023-08-18 DIAGNOSIS — E08.319 DIABETIC RETINOPATHY OF BOTH EYES ASSOCIATED WITH DIABETES MELLITUS DUE TO UNDERLYING CONDITION, MACULAR EDEMA PRESENCE UNSPECIFIED, UNSPECIFIED RETINOPATHY SEVERITY (HCC): ICD-10-CM

## 2023-08-18 DIAGNOSIS — E11.69 TYPE 2 DIABETES MELLITUS WITH HYPERCHOLESTEROLEMIA (HCC): ICD-10-CM

## 2023-08-18 DIAGNOSIS — E78.00 TYPE 2 DIABETES MELLITUS WITH HYPERCHOLESTEROLEMIA (HCC): ICD-10-CM

## 2023-08-18 DIAGNOSIS — E03.9 PRIMARY HYPOTHYROIDISM: ICD-10-CM

## 2023-08-18 DIAGNOSIS — E11.65 TYPE 2 DIABETES MELLITUS WITH HYPERGLYCEMIA, WITH LONG-TERM CURRENT USE OF INSULIN (HCC): Primary | ICD-10-CM

## 2023-08-18 LAB — HBA1C MFR BLD: 13.5 %

## 2023-08-18 RX ORDER — TIRZEPATIDE 5 MG/.5ML
5 INJECTION, SOLUTION SUBCUTANEOUS WEEKLY
Qty: 6 ML | Refills: 3 | Status: SHIPPED | OUTPATIENT
Start: 2023-09-15

## 2023-08-18 RX ORDER — INSULIN LISPRO 100 [IU]/ML
INJECTION, SOLUTION INTRAVENOUS; SUBCUTANEOUS
Qty: 15 ML | Refills: 3 | Status: SHIPPED | OUTPATIENT
Start: 2023-08-18

## 2023-08-18 RX ORDER — INSULIN HUMAN 500 [IU]/ML
INJECTION, SOLUTION SUBCUTANEOUS
Qty: 18 ML | Refills: 3 | Status: SHIPPED | OUTPATIENT
Start: 2023-08-18

## 2023-08-18 RX ORDER — EMPAGLIFLOZIN 25 MG/1
25 TABLET, FILM COATED ORAL DAILY
Qty: 90 TABLET | Refills: 3 | Status: SHIPPED | OUTPATIENT
Start: 2023-08-18

## 2023-08-18 RX ORDER — TIRZEPATIDE 2.5 MG/.5ML
2.5 INJECTION, SOLUTION SUBCUTANEOUS WEEKLY
Qty: 2 ML | Refills: 0 | Status: SHIPPED | OUTPATIENT
Start: 2023-08-18

## 2023-08-18 RX ORDER — ACYCLOVIR 400 MG/1
TABLET ORAL
Qty: 9 EACH | Refills: 3 | Status: SHIPPED | OUTPATIENT
Start: 2023-08-18

## 2023-08-18 ASSESSMENT — ENCOUNTER SYMPTOMS
CONSTIPATION: 0
DIARRHEA: 0

## 2023-08-18 NOTE — PROGRESS NOTES
Rwoena Sandoval, [address] [facility] End[facility]  [address], [address] [facility], [address] [facility]            Steve Horton is seen today for follow up of type 2 diabetes mellitus. ASSESSMENT AND PLAN:    1. Type 2 diabetes mellitus with hyperglycemia, with long-term current use of insulin ([facility])  A1c 13.5%. Glycemic control poor. He had some difficulty telling me which medications he was taking. When we reviewed his dispense history it turns out he is only taking Toujeo and Humalog. I suspect this is part of the reason why his A1c is so high today. Additionally, he is not checking his blood sugars regularly at all. He reports he stopped using Dexcom as his phone is incompatible. I am going to stop his Toujeo completely. 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 We will start U-500 with a 20% increase in his total daily dose. I will have him use his Humalog as a sliding scale only. We will start Mounjaro and Jardiance. Patient was given a written treatment plan with the above information. He is an excellent candidate for CGM therapy. We placed a G7 sensor today and gave him a  since his phone is incompatible. I sent a prescription for G7 to his pharmacy. Due for routine lab work. Orders placed today. At today's visit we discussed sequela associated with uncontrolled diabetes including increased risk of stroke, heart attack, kidney failure, amputation, retinopathy, neuropathy, and nephropathy. Patient was strongly encouraged to comply with treatment regimen as well as dietary and exercise recommendations to aid in glycemic control to help prevent complications associated with uncontrolled diabetes. - AMB POC HEMOGLOBIN A1C  - Continuous Blood Gluc Sensor (DEXCOM G7 SENSOR) MISC; Use to monitor glucose, change q 10 days E11.65  Dispense: 9 each;  Refill: 3  - HUMULIN R

## 2023-09-27 DIAGNOSIS — Z79.4 TYPE 2 DIABETES MELLITUS WITH HYPERGLYCEMIA, WITH LONG-TERM CURRENT USE OF INSULIN (HCC): ICD-10-CM

## 2023-09-27 DIAGNOSIS — E11.65 TYPE 2 DIABETES MELLITUS WITH HYPERGLYCEMIA, WITH LONG-TERM CURRENT USE OF INSULIN (HCC): ICD-10-CM

## 2023-09-27 RX ORDER — TIRZEPATIDE 2.5 MG/.5ML
2.5 INJECTION, SOLUTION SUBCUTANEOUS WEEKLY
OUTPATIENT
Start: 2023-09-27

## 2023-10-09 DIAGNOSIS — E55.9 VITAMIN D DEFICIENCY: ICD-10-CM

## 2023-10-09 DIAGNOSIS — Z79.4 TYPE 2 DIABETES MELLITUS WITH HYPERGLYCEMIA, WITH LONG-TERM CURRENT USE OF INSULIN (HCC): ICD-10-CM

## 2023-10-09 DIAGNOSIS — E11.65 TYPE 2 DIABETES MELLITUS WITH HYPERGLYCEMIA, WITH LONG-TERM CURRENT USE OF INSULIN (HCC): ICD-10-CM

## 2023-10-09 LAB
ALBUMIN SERPL-MCNC: 4.5 G/DL (ref 3.2–4.6)
ALBUMIN/GLOB SERPL: 1.4 (ref 0.4–1.6)
ALP SERPL-CCNC: 63 U/L (ref 50–136)
ALT SERPL-CCNC: 40 U/L (ref 12–65)
ANION GAP SERPL CALC-SCNC: 6 MMOL/L (ref 2–11)
AST SERPL-CCNC: 17 U/L (ref 15–37)
BASOPHILS # BLD: 0.1 K/UL (ref 0–0.2)
BASOPHILS NFR BLD: 1 % (ref 0–2)
BILIRUB SERPL-MCNC: 0.5 MG/DL (ref 0.2–1.1)
BUN SERPL-MCNC: 17 MG/DL (ref 8–23)
CALCIUM SERPL-MCNC: 9.4 MG/DL (ref 8.3–10.4)
CHLORIDE SERPL-SCNC: 109 MMOL/L (ref 101–110)
CHOLEST SERPL-MCNC: 120 MG/DL
CO2 SERPL-SCNC: 24 MMOL/L (ref 21–32)
CREAT SERPL-MCNC: 0.7 MG/DL (ref 0.8–1.5)
CREAT UR-MCNC: 57 MG/DL
DIFFERENTIAL METHOD BLD: NORMAL
EOSINOPHIL # BLD: 0.4 K/UL (ref 0–0.8)
EOSINOPHIL NFR BLD: 4 % (ref 0.5–7.8)
ERYTHROCYTE [DISTWIDTH] IN BLOOD BY AUTOMATED COUNT: 12.9 % (ref 11.9–14.6)
GLOBULIN SER CALC-MCNC: 3.2 G/DL (ref 2.8–4.5)
GLUCOSE SERPL-MCNC: 170 MG/DL (ref 65–100)
HCT VFR BLD AUTO: 48.3 % (ref 41.1–50.3)
HDLC SERPL-MCNC: 47 MG/DL (ref 40–60)
HDLC SERPL: 2.6
HGB BLD-MCNC: 15.8 G/DL (ref 13.6–17.2)
IMM GRANULOCYTES # BLD AUTO: 0 K/UL (ref 0–0.5)
IMM GRANULOCYTES NFR BLD AUTO: 0 % (ref 0–5)
LDLC SERPL CALC-MCNC: 43 MG/DL
LYMPHOCYTES # BLD: 2 K/UL (ref 0.5–4.6)
LYMPHOCYTES NFR BLD: 25 % (ref 13–44)
MCH RBC QN AUTO: 28.6 PG (ref 26.1–32.9)
MCHC RBC AUTO-ENTMCNC: 32.7 G/DL (ref 31.4–35)
MCV RBC AUTO: 87.3 FL (ref 82–102)
MICROALBUMIN UR-MCNC: 0.6 MG/DL
MICROALBUMIN/CREAT UR-RTO: 11 MG/G (ref 0–30)
MONOCYTES # BLD: 0.6 K/UL (ref 0.1–1.3)
MONOCYTES NFR BLD: 8 % (ref 4–12)
NEUTS SEG # BLD: 5 K/UL (ref 1.7–8.2)
NEUTS SEG NFR BLD: 62 % (ref 43–78)
NRBC # BLD: 0 K/UL (ref 0–0.2)
PLATELET # BLD AUTO: 354 K/UL (ref 150–450)
PMV BLD AUTO: 9.4 FL (ref 9.4–12.3)
POTASSIUM SERPL-SCNC: 4.9 MMOL/L (ref 3.5–5.1)
PROT SERPL-MCNC: 7.7 G/DL (ref 6.3–8.2)
RBC # BLD AUTO: 5.53 M/UL (ref 4.23–5.6)
SODIUM SERPL-SCNC: 139 MMOL/L (ref 133–143)
TRIGL SERPL-MCNC: 150 MG/DL (ref 35–150)
TSH W FREE THYROID IF ABNORMAL: 0.85 UIU/ML (ref 0.36–3.74)
VLDLC SERPL CALC-MCNC: 30 MG/DL (ref 6–23)
WBC # BLD AUTO: 8 K/UL (ref 4.3–11.1)

## 2023-10-10 LAB — 1,25(OH)2D3 SERPL-MCNC: 29.9 PG/ML (ref 24.8–81.5)

## 2023-10-25 ASSESSMENT — ENCOUNTER SYMPTOMS
CONSTIPATION: 0
DIARRHEA: 0

## 2023-10-25 NOTE — PROGRESS NOTES
Ra Fulton, [address] [facility] Endocr[facility]  [address], [address] [facility], [address]            Hung Bourgeois is seen today for follow up of type 2 diabetes mellitus. ASSESSMENT AND PLAN:  Interpretation of 72 hour glucose monitor: At least 72 hours of data were reviewed. The patient utilizes a Dexcom G7 continuous glucose monitoring system. The average glucose during the reviewed timeframe was 224 with a standard deviation of 67 (time in range 18%, time above range 82%, time below range 0%). 1. Type 2 diabetes mellitus with hyperglycemia, with long-term current use of insulin (HCC)  TIR 18%. Glycemic control poor. Unfortunately, he misunderstood my instructions on how to take his insulin and has been taking about third of what I prescribed. We educated on proper dosing. Patient provided with written treatment plan. He does occasionally miss his mealtime short acting insulin due to time constraints. Discussed using a Cecur insulin patch for improved compliance and patient is agreeable. Prescription was sent to the pharmacy. Patient is advised to call when he receives the device so we can set up training. Lab work ordered at last visit reviewed with patient.    - CEQUR SIMPLICITY 2U ZAC; Use with insulin, change every 3 days, E11.65  Dispense: 30 each; Refill: 3  - [place] CONTINUOUS GLUCOSE MONITORING ANALYSIS I&R    2. Type 2 diabetes mellitus with hypercholesterolemia ([facility])  LDL drawn 10/9/2023 was 43, below ADA goal of less than 70. Continue rosuvastatin at current dosing. Recheck lipid panel in 1 year. Patient counseled on low saturated/transfat/partially hydrogenated oil, low cholesterol, low carb diet. Increase fruits/veggies/fiber intake and avoid concentrated sugars such as refined sugars, fruit juices, and high fructose beverages. Aerobic exercise 30-40 minutes 3-4x/wk, never more than 2 days between exercise.   Reviewed risk of cardiovascular disease with

## 2023-10-26 ENCOUNTER — OFFICE VISIT (OUTPATIENT)
Dept: ENDOCRINOLOGY | Age: 58
End: 2023-10-26

## 2023-10-26 VITALS
BODY MASS INDEX: 32.89 KG/M2 | WEIGHT: 210 LBS | HEART RATE: 52 BPM | SYSTOLIC BLOOD PRESSURE: 130 MMHG | OXYGEN SATURATION: 98 % | DIASTOLIC BLOOD PRESSURE: 85 MMHG

## 2023-10-26 DIAGNOSIS — E11.59 HYPERTENSION ASSOCIATED WITH TYPE 2 DIABETES MELLITUS (HCC): Chronic | ICD-10-CM

## 2023-10-26 DIAGNOSIS — Z79.4 TYPE 2 DIABETES MELLITUS WITH HYPERGLYCEMIA, WITH LONG-TERM CURRENT USE OF INSULIN (HCC): Primary | ICD-10-CM

## 2023-10-26 DIAGNOSIS — I15.2 HYPERTENSION ASSOCIATED WITH TYPE 2 DIABETES MELLITUS (HCC): Chronic | ICD-10-CM

## 2023-10-26 DIAGNOSIS — E78.00 TYPE 2 DIABETES MELLITUS WITH HYPERCHOLESTEROLEMIA (HCC): ICD-10-CM

## 2023-10-26 DIAGNOSIS — E03.9 PRIMARY HYPOTHYROIDISM: ICD-10-CM

## 2023-10-26 DIAGNOSIS — E11.69 TYPE 2 DIABETES MELLITUS WITH HYPERCHOLESTEROLEMIA (HCC): ICD-10-CM

## 2023-10-26 DIAGNOSIS — E55.9 VITAMIN D DEFICIENCY: ICD-10-CM

## 2023-10-26 DIAGNOSIS — E11.65 TYPE 2 DIABETES MELLITUS WITH HYPERGLYCEMIA, WITH LONG-TERM CURRENT USE OF INSULIN (HCC): Primary | ICD-10-CM

## 2023-10-26 RX ORDER — BOLUS INSULIN PUMP, 200 UNIT 2 UNIT
EACH MISCELLANEOUS
Qty: 30 EACH | Refills: 3 | Status: SHIPPED | OUTPATIENT
Start: 2023-10-26

## 2023-10-27 NOTE — PROGRESS NOTES
Spoke to pt, he stated the pharm did not have Cequr patch ready and they will contact him. Advised to call the office so we can contact Norm Born for training. He expressed understanding.

## 2023-11-22 ENCOUNTER — TELEPHONE (OUTPATIENT)
Dept: ENDOCRINOLOGY | Age: 58
End: 2023-11-22

## 2023-11-22 DIAGNOSIS — I10 ESSENTIAL HYPERTENSION: ICD-10-CM

## 2023-11-22 DIAGNOSIS — E11.65 TYPE 2 DIABETES MELLITUS WITH HYPERGLYCEMIA, WITH LONG-TERM CURRENT USE OF INSULIN (HCC): ICD-10-CM

## 2023-11-22 DIAGNOSIS — Z79.4 TYPE 2 DIABETES MELLITUS WITH HYPERGLYCEMIA, WITH LONG-TERM CURRENT USE OF INSULIN (HCC): ICD-10-CM

## 2023-11-22 DIAGNOSIS — E03.9 HYPOTHYROIDISM, UNSPECIFIED TYPE: ICD-10-CM

## 2023-11-24 DIAGNOSIS — Z79.4 TYPE 2 DIABETES MELLITUS WITH HYPERGLYCEMIA, WITH LONG-TERM CURRENT USE OF INSULIN (HCC): ICD-10-CM

## 2023-11-24 DIAGNOSIS — E11.65 TYPE 2 DIABETES MELLITUS WITH HYPERGLYCEMIA, WITH LONG-TERM CURRENT USE OF INSULIN (HCC): ICD-10-CM

## 2023-11-24 DIAGNOSIS — I10 ESSENTIAL HYPERTENSION: ICD-10-CM

## 2023-11-24 DIAGNOSIS — E78.00 HYPERCHOLESTEROLEMIA: ICD-10-CM

## 2023-11-24 RX ORDER — ROSUVASTATIN CALCIUM 20 MG/1
TABLET, COATED ORAL
Qty: 90 TABLET | Refills: 1 | Status: SHIPPED | OUTPATIENT
Start: 2023-11-24

## 2023-11-24 RX ORDER — EMPAGLIFLOZIN 25 MG/1
TABLET, FILM COATED ORAL
Qty: 90 TABLET | Refills: 1 | Status: CANCELLED | OUTPATIENT
Start: 2023-11-24

## 2023-11-24 RX ORDER — INSULIN HUMAN 500 [IU]/ML
INJECTION, SOLUTION SUBCUTANEOUS
Qty: 18 ML | Refills: 3 | Status: CANCELLED | OUTPATIENT
Start: 2023-11-24

## 2023-11-24 RX ORDER — HYDROCHLOROTHIAZIDE 12.5 MG/1
TABLET ORAL
Qty: 90 TABLET | Refills: 1 | Status: SHIPPED | OUTPATIENT
Start: 2023-11-24

## 2023-11-24 RX ORDER — AMLODIPINE BESYLATE AND BENAZEPRIL HYDROCHLORIDE 10; 20 MG/1; MG/1
CAPSULE ORAL
Qty: 90 CAPSULE | Refills: 1 | Status: CANCELLED | OUTPATIENT
Start: 2023-11-24

## 2023-11-24 RX ORDER — GLUCAGON INJECTION, SOLUTION 1 MG/.2ML
INJECTION, SOLUTION SUBCUTANEOUS
Qty: 0.4 ML | Refills: 2 | Status: CANCELLED | OUTPATIENT
Start: 2023-11-24

## 2023-11-24 NOTE — TELEPHONE ENCOUNTER
I sent his refills in. I'll let Edson Olivas know he has them and he will call patient to set up a training.      Claus Duval, RONALD - CNP

## 2023-11-24 NOTE — TELEPHONE ENCOUNTER
Patient stated that the pharmacy is needing a new prescription on all the pending medications below.

## 2023-11-24 NOTE — TELEPHONE ENCOUNTER
Patient called back and he needs refills on all of his medications we give him to [facility]

## 2023-11-28 DIAGNOSIS — E11.65 TYPE 2 DIABETES MELLITUS WITH HYPERGLYCEMIA, WITH LONG-TERM CURRENT USE OF INSULIN (HCC): ICD-10-CM

## 2023-11-28 DIAGNOSIS — Z79.4 TYPE 2 DIABETES MELLITUS WITH HYPERGLYCEMIA, WITH LONG-TERM CURRENT USE OF INSULIN (HCC): ICD-10-CM

## 2023-11-28 DIAGNOSIS — I10 ESSENTIAL HYPERTENSION: ICD-10-CM

## 2023-11-28 DIAGNOSIS — E03.9 HYPOTHYROIDISM, UNSPECIFIED TYPE: ICD-10-CM

## 2023-11-28 RX ORDER — LEVOTHYROXINE SODIUM 0.2 MG/1
200 TABLET ORAL
Qty: 30 TABLET | Refills: 11 | Status: SHIPPED | OUTPATIENT
Start: 2023-11-28

## 2023-11-28 RX ORDER — LANCETS 30 GAUGE
EACH MISCELLANEOUS
Qty: 400 EACH | Refills: 3 | Status: CANCELLED | OUTPATIENT
Start: 2023-11-28

## 2023-11-28 RX ORDER — ACYCLOVIR 400 MG/1
TABLET ORAL
Qty: 9 EACH | Refills: 3 | Status: CANCELLED | OUTPATIENT
Start: 2023-11-28

## 2023-11-28 RX ORDER — TIRZEPATIDE 5 MG/.5ML
5 INJECTION, SOLUTION SUBCUTANEOUS WEEKLY
Qty: 6 ML | Refills: 3 | Status: CANCELLED | OUTPATIENT
Start: 2023-11-28

## 2023-11-28 RX ORDER — EMPAGLIFLOZIN 25 MG/1
25 TABLET, FILM COATED ORAL DAILY
Qty: 90 TABLET | Refills: 3 | Status: CANCELLED | OUTPATIENT
Start: 2023-11-28

## 2023-11-28 RX ORDER — AMLODIPINE BESYLATE AND BENAZEPRIL HYDROCHLORIDE 10; 20 MG/1; MG/1
1 CAPSULE ORAL DAILY
Qty: 30 CAPSULE | Refills: 11 | Status: SHIPPED | OUTPATIENT
Start: 2023-11-28

## 2023-11-28 RX ORDER — GLUCAGON INJECTION, SOLUTION 1 MG/.2ML
1 INJECTION, SOLUTION SUBCUTANEOUS AS NEEDED
Qty: 0.4 ML | Refills: 11 | Status: CANCELLED | OUTPATIENT
Start: 2023-11-28

## 2023-11-28 RX ORDER — TIRZEPATIDE 5 MG/.5ML
5 INJECTION, SOLUTION SUBCUTANEOUS WEEKLY
Qty: 6 ML | Refills: 3 | Status: SHIPPED | OUTPATIENT
Start: 2023-11-28

## 2023-11-28 RX ORDER — PEN NEEDLE, DIABETIC 32GX 5/32"
1 NEEDLE, DISPOSABLE MISCELLANEOUS DAILY
Qty: 200 EACH | Refills: 11 | Status: CANCELLED | OUTPATIENT
Start: 2023-11-28

## 2023-11-28 RX ORDER — INSULIN HUMAN 500 [IU]/ML
INJECTION, SOLUTION SUBCUTANEOUS
Qty: 18 ML | Refills: 3 | Status: CANCELLED | OUTPATIENT
Start: 2023-11-28

## 2023-11-28 RX ORDER — INSULIN LISPRO 100 [IU]/ML
INJECTION, SOLUTION INTRAVENOUS; SUBCUTANEOUS
Qty: 15 ML | Refills: 3 | Status: CANCELLED | OUTPATIENT
Start: 2023-11-28

## 2023-11-28 RX ORDER — LEVOTHYROXINE SODIUM 0.2 MG/1
200 TABLET ORAL
Qty: 30 TABLET | Refills: 11 | Status: CANCELLED | OUTPATIENT
Start: 2023-11-28

## 2023-11-28 RX ORDER — AMLODIPINE BESYLATE AND BENAZEPRIL HYDROCHLORIDE 10; 20 MG/1; MG/1
1 CAPSULE ORAL DAILY
Qty: 30 CAPSULE | Refills: 11 | Status: CANCELLED | OUTPATIENT
Start: 2023-11-28

## 2023-11-28 RX ORDER — BLOOD-GLUCOSE METER
EACH MISCELLANEOUS
Qty: 1 KIT | Refills: 11 | Status: CANCELLED | OUTPATIENT
Start: 2023-11-28

## 2023-11-29 ENCOUNTER — TELEPHONE (OUTPATIENT)
Dept: ENDOCRINOLOGY | Age: 58
End: 2023-11-29

## 2023-11-29 NOTE — TELEPHONE ENCOUNTER
Patient left a message stating the wrong medications was refilled. He needs refill for amlodipine and levothyroxine. Please call patient.

## 2024-01-08 ASSESSMENT — ENCOUNTER SYMPTOMS
CONSTIPATION: 0
DIARRHEA: 0

## 2024-01-08 NOTE — PROGRESS NOTES
[facility], [facility] End[facility]in[facility]  [address], [address]  [place] [address]            Justin Munoz is seen today for follow up of type 2 diabetes mellitus.      ASSESSMENT AND PLAN:  1. Type 2 diabetes mellitus with hyperglycemia, with long-term current use of insulin ([facility])  A1c 10.5%. Glycemic control poor.   Restart Mounjaro 5 mg for 4 weeks then increase to 7.5 mg. Increase U500 to 70 units (7 clicks) at breakfast and 50 units (5 clicks) at dinner.   Patient provided with number to contact Bird at [facility] for training. Reset Dexcom passwords and set up sharing.   Patient is overdue for his annual diabetic eye exam. Counseled on optimizing glycemic control, blood pressure and cholesterol to reduce risk or slow progression of diabetic retinopathy. Advised to schedule an appointment with opthalmology.    - AMB POC HEMOGLOBIN A1C  - MOUNJARO 5 MG/0.5ML SOPN SC injection; Inject 0.5 mLs into the skin once a week  Dispense: 2 mL; Refill: 0  - MOUNJARO 7.5 MG/0.5ML SOPN SC injection; Inject 0.5 mLs into the skin once a week E11.65  Dispense: 6 mL; Refill: 1  - HUMULIN R U-500 KWIKPEN 500 UNIT/ML SOPN concentrated injection pen; Inject 70 Units into the skin every morning (before breakfast) AND 50 Units daily (before dinner). Take 30 minutes before meal.  Dispense: 24 mL; Refill: 3  - JARDIANCE 25 MG tablet; Take 1 tablet by mouth daily  Dispense: 90 tablet; Refill: 3  - Continuous Blood Gluc Sensor (DEXCOM G7 SENSOR) MISC; Use to monitor glucose, change q 10 days E11.65  Dispense: 9 each; Refill: 3  - CEQUR SIMPLICITY 2U ZAC; Use with insulin, change every 3 days, E11.65  Dispense: 30 each; Refill: 3  - GVOKE HYPOPEN 2-PACK 1 MG/0.2ML SOAJ; Inject 1 mg into the skin as needed (as needed for BG less than 50 mg/dL)  Dispense: 0.4 mL; Refill: 3    2. Type 2 diabetes mellitus with hypercholesterolemia (HCC)  Refills sent as requested.     - rosuvastatin (CRESTOR) 20 MG tablet; Take 1

## 2024-01-09 ENCOUNTER — OFFICE VISIT (OUTPATIENT)
Dept: ENDOCRINOLOGY | Age: 59
End: 2024-01-09
Payer: COMMERCIAL

## 2024-01-09 VITALS
OXYGEN SATURATION: 98 % | DIASTOLIC BLOOD PRESSURE: 70 MMHG | BODY MASS INDEX: 34.25 KG/M2 | WEIGHT: 218.2 LBS | HEART RATE: 102 BPM | SYSTOLIC BLOOD PRESSURE: 118 MMHG | HEIGHT: 67 IN

## 2024-01-09 DIAGNOSIS — E11.65 TYPE 2 DIABETES MELLITUS WITH HYPERGLYCEMIA, WITH LONG-TERM CURRENT USE OF INSULIN (HCC): Primary | ICD-10-CM

## 2024-01-09 DIAGNOSIS — N52.9 ERECTILE DYSFUNCTION, UNSPECIFIED ERECTILE DYSFUNCTION TYPE: ICD-10-CM

## 2024-01-09 DIAGNOSIS — I15.2 HYPERTENSION ASSOCIATED WITH TYPE 2 DIABETES MELLITUS (HCC): Chronic | ICD-10-CM

## 2024-01-09 DIAGNOSIS — E03.9 PRIMARY HYPOTHYROIDISM: ICD-10-CM

## 2024-01-09 DIAGNOSIS — E11.69 TYPE 2 DIABETES MELLITUS WITH HYPERCHOLESTEROLEMIA (HCC): ICD-10-CM

## 2024-01-09 DIAGNOSIS — Z79.4 TYPE 2 DIABETES MELLITUS WITH HYPERGLYCEMIA, WITH LONG-TERM CURRENT USE OF INSULIN (HCC): Primary | ICD-10-CM

## 2024-01-09 DIAGNOSIS — E11.59 HYPERTENSION ASSOCIATED WITH TYPE 2 DIABETES MELLITUS (HCC): Chronic | ICD-10-CM

## 2024-01-09 DIAGNOSIS — E78.00 TYPE 2 DIABETES MELLITUS WITH HYPERCHOLESTEROLEMIA (HCC): ICD-10-CM

## 2024-01-09 LAB — HBA1C MFR BLD: 10.5 %

## 2024-01-09 PROCEDURE — G8484 FLU IMMUNIZE NO ADMIN: HCPCS | Performed by: NURSE PRACTITIONER

## 2024-01-09 PROCEDURE — 99214 OFFICE O/P EST MOD 30 MIN: CPT | Performed by: NURSE PRACTITIONER

## 2024-01-09 PROCEDURE — 1036F TOBACCO NON-USER: CPT | Performed by: NURSE PRACTITIONER

## 2024-01-09 PROCEDURE — 2022F DILAT RTA XM EVC RTNOPTHY: CPT | Performed by: NURSE PRACTITIONER

## 2024-01-09 PROCEDURE — G8417 CALC BMI ABV UP PARAM F/U: HCPCS | Performed by: NURSE PRACTITIONER

## 2024-01-09 PROCEDURE — 3046F HEMOGLOBIN A1C LEVEL >9.0%: CPT | Performed by: NURSE PRACTITIONER

## 2024-01-09 PROCEDURE — 83036 HEMOGLOBIN GLYCOSYLATED A1C: CPT | Performed by: NURSE PRACTITIONER

## 2024-01-09 PROCEDURE — 3074F SYST BP LT 130 MM HG: CPT | Performed by: NURSE PRACTITIONER

## 2024-01-09 PROCEDURE — G8427 DOCREV CUR MEDS BY ELIG CLIN: HCPCS | Performed by: NURSE PRACTITIONER

## 2024-01-09 PROCEDURE — 3078F DIAST BP <80 MM HG: CPT | Performed by: NURSE PRACTITIONER

## 2024-01-09 PROCEDURE — 3017F COLORECTAL CA SCREEN DOC REV: CPT | Performed by: NURSE PRACTITIONER

## 2024-01-09 RX ORDER — AMLODIPINE BESYLATE AND BENAZEPRIL HYDROCHLORIDE 10; 20 MG/1; MG/1
1 CAPSULE ORAL DAILY
Qty: 90 CAPSULE | Refills: 3 | Status: SHIPPED | OUTPATIENT
Start: 2024-01-09

## 2024-01-09 RX ORDER — INSULIN HUMAN 500 [IU]/ML
INJECTION, SOLUTION SUBCUTANEOUS
Qty: 24 ML | Refills: 3 | Status: SHIPPED | OUTPATIENT
Start: 2024-01-09

## 2024-01-09 RX ORDER — SILDENAFIL 100 MG/1
100 TABLET, FILM COATED ORAL PRN
Qty: 30 TABLET | Refills: 3 | Status: SHIPPED | OUTPATIENT
Start: 2024-01-09

## 2024-01-09 RX ORDER — GLUCAGON INJECTION, SOLUTION 1 MG/.2ML
1 INJECTION, SOLUTION SUBCUTANEOUS AS NEEDED
Qty: 0.4 ML | Refills: 3 | Status: SHIPPED | OUTPATIENT
Start: 2024-01-09

## 2024-01-09 RX ORDER — TIRZEPATIDE 5 MG/.5ML
5 INJECTION, SOLUTION SUBCUTANEOUS WEEKLY
Qty: 2 ML | Refills: 0 | Status: SHIPPED | OUTPATIENT
Start: 2024-01-09

## 2024-01-09 RX ORDER — ACYCLOVIR 400 MG/1
TABLET ORAL
Qty: 9 EACH | Refills: 3 | Status: SHIPPED | OUTPATIENT
Start: 2024-01-09

## 2024-01-09 RX ORDER — EMPAGLIFLOZIN 25 MG/1
25 TABLET, FILM COATED ORAL DAILY
Qty: 90 TABLET | Refills: 3 | Status: SHIPPED | OUTPATIENT
Start: 2024-01-09

## 2024-01-09 RX ORDER — ROSUVASTATIN CALCIUM 20 MG/1
TABLET, COATED ORAL
Qty: 90 TABLET | Refills: 3 | Status: SHIPPED | OUTPATIENT
Start: 2024-01-09

## 2024-01-09 RX ORDER — TIRZEPATIDE 7.5 MG/.5ML
7.5 INJECTION, SOLUTION SUBCUTANEOUS WEEKLY
Qty: 6 ML | Refills: 1 | Status: SHIPPED | OUTPATIENT
Start: 2024-01-09

## 2024-01-09 RX ORDER — LEVOTHYROXINE SODIUM 0.2 MG/1
200 TABLET ORAL
Qty: 90 TABLET | Refills: 3 | Status: SHIPPED | OUTPATIENT
Start: 2024-01-09

## 2024-01-09 RX ORDER — BOLUS INSULIN PUMP, 200 UNIT 2 UNIT
EACH MISCELLANEOUS
Qty: 30 EACH | Refills: 3 | Status: SHIPPED | OUTPATIENT
Start: 2024-01-09

## 2024-01-09 RX ORDER — HYDROCHLOROTHIAZIDE 12.5 MG/1
TABLET ORAL
Qty: 90 TABLET | Refills: 3 | Status: SHIPPED | OUTPATIENT
Start: 2024-01-09

## 2024-01-11 ENCOUNTER — TELEPHONE (OUTPATIENT)
Dept: ENDOCRINOLOGY | Age: 59
End: 2024-01-11

## 2024-01-11 NOTE — TELEPHONE ENCOUNTER
Dexcom G7 Sensor    Prior authorization approved Case ID: [id number]      Payer: [employer] - [employer]   Request Reference Number: [id number]. DEXCOM G7 MIS SENSOR is approved through 01/11/2025. Your patient may now fill this prescription and it will be covered.   Approval Details    Authorized from January 11, 2024 to January 11, 2025      Electronic appeal: Not supported   View History

## 2024-02-20 ASSESSMENT — ENCOUNTER SYMPTOMS
DIARRHEA: 0
CONSTIPATION: 0

## 2024-02-20 NOTE — PROGRESS NOTES
[facility], [facility]  [address], [address]  [place] [address]            Justin Munoz is seen today for follow up of type 2 diabetes mellitus.      ASSESSMENT AND PLAN:  Interpretation of 72 hour glucose monitor:  At least 72 hours of data were reviewed.  The patient utilizes a Dexcom G7 continuous glucose monitoring system.  The average glucose during the reviewed timeframe was 231 with a standard deviation of 71 (time in range 26%, time above range 74%, time below range 0%).       1. Type 2 diabetes mellitus with hyperglycemia, with long-term current use of insulin (HCC)  TIR 26%, GMI: 8.8%. Glycemic control sub optimal. Minimal improvement since last visit.   Unfortunately, he is still not taking the amount of insulin I prescribed. Re-educated on appropriate doses. No other changes today. 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 Advised him to have the rep call me if they run into trouble.     - IN CONTINUOUS GLUCOSE MONITORING ANALYSIS I&R    2. Hypertension associated with type 2 diabetes mellitus (HCC)  Refills sent as requested.     - amLODIPine-benazepril (LOTREL) 10-20 MG per capsule; Take 1 capsule by mouth daily  Dispense: 90 capsule; Refill: 3  - hydroCHLOROthiazide 12.5 MG tablet; Take 1 tablet by mouth daily  Dispense: 90 tablet; Refill: 3    3. Type 2 diabetes mellitus with hypercholesterolemia (HCC)  Refills sent as requested.     - rosuvastatin (CRESTOR) 20 MG tablet; Take 1 tablet by mouth daily  Dispense: 90 tablet; Refill: 3    4. Primary hypothyroidism  Refills sent as requested.     - levothyroxine (SYNTHROID) 200 MCG tablet; Take 1 tablet by mouth every morning (before breakfast)  Dispense: 90 tablet; Refill: 3        Return in about 7 weeks (around 4/9/2024).     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 Hopes to do it tomorrow.     Barriers to care: health literacy, memory    Date of

## 2024-02-22 ENCOUNTER — OFFICE VISIT (OUTPATIENT)
Dept: ENDOCRINOLOGY | Age: 59
End: 2024-02-22

## 2024-02-22 VITALS
DIASTOLIC BLOOD PRESSURE: 72 MMHG | WEIGHT: 230.2 LBS | HEIGHT: 67 IN | OXYGEN SATURATION: 98 % | SYSTOLIC BLOOD PRESSURE: 136 MMHG | HEART RATE: 88 BPM | BODY MASS INDEX: 36.13 KG/M2

## 2024-02-22 DIAGNOSIS — I15.2 HYPERTENSION ASSOCIATED WITH TYPE 2 DIABETES MELLITUS (HCC): Chronic | ICD-10-CM

## 2024-02-22 DIAGNOSIS — E78.00 TYPE 2 DIABETES MELLITUS WITH HYPERCHOLESTEROLEMIA (HCC): ICD-10-CM

## 2024-02-22 DIAGNOSIS — E11.65 TYPE 2 DIABETES MELLITUS WITH HYPERGLYCEMIA, WITH LONG-TERM CURRENT USE OF INSULIN (HCC): Primary | ICD-10-CM

## 2024-02-22 DIAGNOSIS — E03.9 PRIMARY HYPOTHYROIDISM: ICD-10-CM

## 2024-02-22 DIAGNOSIS — Z79.4 TYPE 2 DIABETES MELLITUS WITH HYPERGLYCEMIA, WITH LONG-TERM CURRENT USE OF INSULIN (HCC): Primary | ICD-10-CM

## 2024-02-22 DIAGNOSIS — E11.69 TYPE 2 DIABETES MELLITUS WITH HYPERCHOLESTEROLEMIA (HCC): ICD-10-CM

## 2024-02-22 DIAGNOSIS — E11.59 HYPERTENSION ASSOCIATED WITH TYPE 2 DIABETES MELLITUS (HCC): Chronic | ICD-10-CM

## 2024-02-22 RX ORDER — ROSUVASTATIN CALCIUM 20 MG/1
TABLET, COATED ORAL
Qty: 90 TABLET | Refills: 3 | Status: SHIPPED | OUTPATIENT
Start: 2024-02-22

## 2024-02-22 RX ORDER — AMLODIPINE BESYLATE AND BENAZEPRIL HYDROCHLORIDE 10; 20 MG/1; MG/1
1 CAPSULE ORAL DAILY
Qty: 90 CAPSULE | Refills: 3 | Status: SHIPPED | OUTPATIENT
Start: 2024-02-22

## 2024-02-22 RX ORDER — LEVOTHYROXINE SODIUM 0.2 MG/1
200 TABLET ORAL
Qty: 90 TABLET | Refills: 3 | Status: SHIPPED | OUTPATIENT
Start: 2024-02-22

## 2024-02-22 RX ORDER — ESOMEPRAZOLE MAGNESIUM 40 MG/1
40 CAPSULE, DELAYED RELEASE ORAL
Qty: 90 CAPSULE | Refills: 3 | Status: SHIPPED | OUTPATIENT
Start: 2024-02-22

## 2024-02-22 RX ORDER — HYDROCHLOROTHIAZIDE 12.5 MG/1
TABLET ORAL
Qty: 90 TABLET | Refills: 3 | Status: SHIPPED | OUTPATIENT
Start: 2024-02-22

## 2024-02-23 RX ORDER — GLUCAGON INJECTION, SOLUTION 1 MG/.2ML
1 INJECTION, SOLUTION SUBCUTANEOUS AS NEEDED
Qty: 1 EACH | Refills: 3 | Status: SHIPPED | OUTPATIENT
Start: 2024-02-23

## 2024-04-30 ASSESSMENT — ENCOUNTER SYMPTOMS
DIARRHEA: 0
CONSTIPATION: 0

## 2024-04-30 NOTE — PROGRESS NOTES
[place], APRN-[facility] Endocrinology  [address], [address]  [place] [address]            Justin Munoz is seen today for follow up of type 2 diabetes mellitus.      ASSESSMENT AND PLAN:  Interpretation of 72 hour glucose monitor:  At least 72 hours of data were reviewed.  The patient utilizes a Dexcom G7 continuous glucose monitoring system.  The average glucose during the reviewed timeframe was 187 with a coefficient of variation of30.1% (time in range 50%, time above range 50%, time below range 0%).     1. Type 2 diabetes mellitus with hyperglycemia, with long-term current use of insulin (HCC)  A1c 7.9%, TIR 50%, GMI: 7.8%. Glycemic control sub optimal. Improved since last visit.   Increase U-500 doses to 75 units at breakfast and 55 units at dinner. Continue Humalog at current dose. May consider increasing Mounjaro in the future, but defer to next visit when availability will hopefully be better.   Discuss issues with CGM adherence. Offered Eversense. He will consider.   Patient is overdue for his annual diabetic eye exam. Counseled on optimizing glycemic control, blood pressure and cholesterol to reduce risk or slow progression of diabetic retinopathy. Advised to schedule an appointment with opthalmology.     - AMB POC HEMOGLOBIN A1C  - HUMULIN R U-500 KWIKPEN 500 UNIT/ML SOPN concentrated injection pen; Inject 75 Units into the skin every morning (before breakfast) AND 55 Units daily (before dinner). Take 30 minutes before meal.  Dispense: 24 mL; Refill: 3  - [place] CONTINUOUS GLUCOSE MONITORING ANALYSIS I&R    2. Primary hypothyroidism  Patient complaints of unexpected weight gain. Will recheck TFTs today.     - T4, Free; Future  - TSH; Future          Return in about 3 months (around 8/1/2024).     History of Present Illness:  Interim medical updates: decided not to wear the patch. Has knocked a few of his sensors off at work.     Barriers to care: health literacy,

## 2024-05-01 ENCOUNTER — OFFICE VISIT (OUTPATIENT)
Dept: ENDOCRINOLOGY | Age: 59
End: 2024-05-01
Payer: COMMERCIAL

## 2024-05-01 VITALS
WEIGHT: 237 LBS | OXYGEN SATURATION: 97 % | SYSTOLIC BLOOD PRESSURE: 122 MMHG | BODY MASS INDEX: 37.12 KG/M2 | DIASTOLIC BLOOD PRESSURE: 70 MMHG | HEART RATE: 102 BPM

## 2024-05-01 DIAGNOSIS — Z79.4 TYPE 2 DIABETES MELLITUS WITH HYPERGLYCEMIA, WITH LONG-TERM CURRENT USE OF INSULIN (HCC): Primary | ICD-10-CM

## 2024-05-01 DIAGNOSIS — E11.65 TYPE 2 DIABETES MELLITUS WITH HYPERGLYCEMIA, WITH LONG-TERM CURRENT USE OF INSULIN (HCC): Primary | ICD-10-CM

## 2024-05-01 DIAGNOSIS — E03.9 PRIMARY HYPOTHYROIDISM: ICD-10-CM

## 2024-05-01 LAB
HBA1C MFR BLD: 7.9 %
T4 FREE SERPL-MCNC: 1.2 NG/DL (ref 0.9–1.7)
TSH, 3RD GENERATION: 3.97 UIU/ML (ref 0.27–4.2)

## 2024-05-01 PROCEDURE — 95251 CONT GLUC MNTR ANALYSIS I&R: CPT | Performed by: NURSE PRACTITIONER

## 2024-05-01 PROCEDURE — 1036F TOBACCO NON-USER: CPT | Performed by: NURSE PRACTITIONER

## 2024-05-01 PROCEDURE — G8427 DOCREV CUR MEDS BY ELIG CLIN: HCPCS | Performed by: NURSE PRACTITIONER

## 2024-05-01 PROCEDURE — 2022F DILAT RTA XM EVC RTNOPTHY: CPT | Performed by: NURSE PRACTITIONER

## 2024-05-01 PROCEDURE — 3078F DIAST BP <80 MM HG: CPT | Performed by: NURSE PRACTITIONER

## 2024-05-01 PROCEDURE — 83036 HEMOGLOBIN GLYCOSYLATED A1C: CPT | Performed by: NURSE PRACTITIONER

## 2024-05-01 PROCEDURE — 99214 OFFICE O/P EST MOD 30 MIN: CPT | Performed by: NURSE PRACTITIONER

## 2024-05-01 PROCEDURE — G8417 CALC BMI ABV UP PARAM F/U: HCPCS | Performed by: NURSE PRACTITIONER

## 2024-05-01 PROCEDURE — 3074F SYST BP LT 130 MM HG: CPT | Performed by: NURSE PRACTITIONER

## 2024-05-01 PROCEDURE — 3046F HEMOGLOBIN A1C LEVEL >9.0%: CPT | Performed by: NURSE PRACTITIONER

## 2024-05-01 PROCEDURE — 3017F COLORECTAL CA SCREEN DOC REV: CPT | Performed by: NURSE PRACTITIONER

## 2024-05-01 RX ORDER — INSULIN HUMAN 500 [IU]/ML
INJECTION, SOLUTION SUBCUTANEOUS
Qty: 24 ML | Refills: 3
Start: 2024-05-01

## 2024-09-09 ASSESSMENT — ENCOUNTER SYMPTOMS
DIARRHEA: 0
CONSTIPATION: 0

## 2024-09-10 ENCOUNTER — OFFICE VISIT (OUTPATIENT)
Dept: ENDOCRINOLOGY | Age: 59
End: 2024-09-10

## 2024-09-10 VITALS
HEART RATE: 84 BPM | OXYGEN SATURATION: 98 % | WEIGHT: 241.6 LBS | SYSTOLIC BLOOD PRESSURE: 138 MMHG | DIASTOLIC BLOOD PRESSURE: 86 MMHG | BODY MASS INDEX: 37.92 KG/M2 | HEIGHT: 67 IN

## 2024-09-10 DIAGNOSIS — Z79.4 TYPE 2 DIABETES MELLITUS WITH HYPERGLYCEMIA, WITH LONG-TERM CURRENT USE OF INSULIN (HCC): Primary | ICD-10-CM

## 2024-09-10 DIAGNOSIS — N52.9 ERECTILE DYSFUNCTION, UNSPECIFIED ERECTILE DYSFUNCTION TYPE: ICD-10-CM

## 2024-09-10 DIAGNOSIS — E78.00 TYPE 2 DIABETES MELLITUS WITH HYPERCHOLESTEROLEMIA (HCC): ICD-10-CM

## 2024-09-10 DIAGNOSIS — E03.9 PRIMARY HYPOTHYROIDISM: ICD-10-CM

## 2024-09-10 DIAGNOSIS — E11.59 HYPERTENSION ASSOCIATED WITH TYPE 2 DIABETES MELLITUS (HCC): ICD-10-CM

## 2024-09-10 DIAGNOSIS — E55.9 VITAMIN D DEFICIENCY: ICD-10-CM

## 2024-09-10 DIAGNOSIS — E11.69 TYPE 2 DIABETES MELLITUS WITH HYPERCHOLESTEROLEMIA (HCC): ICD-10-CM

## 2024-09-10 DIAGNOSIS — E11.65 TYPE 2 DIABETES MELLITUS WITH HYPERGLYCEMIA, WITH LONG-TERM CURRENT USE OF INSULIN (HCC): Primary | ICD-10-CM

## 2024-09-10 DIAGNOSIS — E08.319 DIABETIC RETINOPATHY OF BOTH EYES ASSOCIATED WITH DIABETES MELLITUS DUE TO UNDERLYING CONDITION, MACULAR EDEMA PRESENCE UNSPECIFIED, UNSPECIFIED RETINOPATHY SEVERITY (HCC): ICD-10-CM

## 2024-09-10 DIAGNOSIS — I15.2 HYPERTENSION ASSOCIATED WITH TYPE 2 DIABETES MELLITUS (HCC): ICD-10-CM

## 2024-09-10 LAB — HBA1C MFR BLD: 10 %

## 2024-09-10 RX ORDER — BLOOD-GLUCOSE METER
EACH MISCELLANEOUS
Qty: 1 KIT | Refills: 11 | Status: SHIPPED | OUTPATIENT
Start: 2024-09-10

## 2024-09-10 RX ORDER — BOLUS INSULIN PUMP, 200 UNIT 2 UNIT
EACH MISCELLANEOUS
Qty: 30 EACH | Refills: 3 | Status: SHIPPED | OUTPATIENT
Start: 2024-09-10 | End: 2024-09-10

## 2024-09-10 RX ORDER — TIRZEPATIDE 7.5 MG/.5ML
7.5 INJECTION, SOLUTION SUBCUTANEOUS WEEKLY
Qty: 6 ML | Refills: 1 | Status: SHIPPED | OUTPATIENT
Start: 2024-09-10

## 2024-09-10 RX ORDER — INSULIN LISPRO 100 [IU]/ML
INJECTION, SOLUTION INTRAVENOUS; SUBCUTANEOUS
Qty: 15 ML | Refills: 3 | Status: SHIPPED | OUTPATIENT
Start: 2024-09-10

## 2024-09-10 RX ORDER — TIRZEPATIDE 5 MG/.5ML
5 INJECTION, SOLUTION SUBCUTANEOUS WEEKLY
Qty: 2 ML | Refills: 0 | Status: SHIPPED | OUTPATIENT
Start: 2024-09-10

## 2024-09-10 RX ORDER — INSULIN HUMAN 500 [IU]/ML
INJECTION, SOLUTION SUBCUTANEOUS
Qty: 24 ML | Refills: 3 | Status: SHIPPED | OUTPATIENT
Start: 2024-09-10

## 2024-09-10 RX ORDER — ROSUVASTATIN CALCIUM 20 MG/1
TABLET, COATED ORAL
Qty: 90 TABLET | Refills: 3 | Status: SHIPPED | OUTPATIENT
Start: 2024-09-10

## 2024-09-10 RX ORDER — LANCETS 30 GAUGE
EACH MISCELLANEOUS
Qty: 400 EACH | Refills: 3 | Status: SHIPPED | OUTPATIENT
Start: 2024-09-10

## 2024-09-10 RX ORDER — HYDROCHLOROTHIAZIDE 12.5 MG/1
TABLET ORAL
Qty: 90 TABLET | Refills: 3 | Status: SHIPPED | OUTPATIENT
Start: 2024-09-10

## 2024-09-10 RX ORDER — INSULIN HUMAN 500 [IU]/ML
INJECTION, SOLUTION SUBCUTANEOUS
Qty: 24 ML | Refills: 3 | Status: SHIPPED | OUTPATIENT
Start: 2024-09-10 | End: 2024-09-10

## 2024-09-10 RX ORDER — ESOMEPRAZOLE MAGNESIUM 40 MG/1
40 CAPSULE, DELAYED RELEASE ORAL
Qty: 90 CAPSULE | Refills: 3 | Status: SHIPPED | OUTPATIENT
Start: 2024-09-10

## 2024-09-10 RX ORDER — EMPAGLIFLOZIN 25 MG/1
25 TABLET, FILM COATED ORAL DAILY
Qty: 90 TABLET | Refills: 3 | Status: SHIPPED | OUTPATIENT
Start: 2024-09-10

## 2024-09-10 RX ORDER — BLOOD SUGAR DIAGNOSTIC
STRIP MISCELLANEOUS
Qty: 200 EACH | Refills: 11 | Status: SHIPPED | OUTPATIENT
Start: 2024-09-10

## 2024-09-10 RX ORDER — PEN NEEDLE, DIABETIC 32GX 5/32"
1 NEEDLE, DISPOSABLE MISCELLANEOUS DAILY
Qty: 200 EACH | Refills: 11 | Status: SHIPPED | OUTPATIENT
Start: 2024-09-10

## 2024-09-10 RX ORDER — LEVOTHYROXINE SODIUM 200 UG/1
200 TABLET ORAL
Qty: 90 TABLET | Refills: 3 | Status: SHIPPED | OUTPATIENT
Start: 2024-09-10

## 2024-09-10 RX ORDER — GLUCAGON INJECTION, SOLUTION 1 MG/.2ML
1 INJECTION, SOLUTION SUBCUTANEOUS AS NEEDED
Qty: 1 EACH | Refills: 3 | Status: SHIPPED | OUTPATIENT
Start: 2024-09-10

## 2024-09-10 RX ORDER — SILDENAFIL 100 MG/1
100 TABLET, FILM COATED ORAL PRN
Qty: 30 TABLET | Refills: 3 | Status: SHIPPED | OUTPATIENT
Start: 2024-09-10

## 2024-09-10 RX ORDER — ACYCLOVIR 400 MG/1
TABLET ORAL
Qty: 9 EACH | Refills: 3 | Status: SHIPPED | OUTPATIENT
Start: 2024-09-10

## 2024-09-10 RX ORDER — AMLODIPINE AND BENAZEPRIL HYDROCHLORIDE 10; 20 MG/1; MG/1
1 CAPSULE ORAL DAILY
Qty: 90 CAPSULE | Refills: 3 | Status: SHIPPED | OUTPATIENT
Start: 2024-09-10

## 2024-09-10 RX ORDER — TIRZEPATIDE 5 MG/.5ML
5 INJECTION, SOLUTION SUBCUTANEOUS WEEKLY
Qty: 2 ML | Refills: 0 | Status: CANCELLED | OUTPATIENT
Start: 2024-09-10

## 2024-09-16 DIAGNOSIS — E03.9 PRIMARY HYPOTHYROIDISM: ICD-10-CM

## 2024-09-16 DIAGNOSIS — E78.00 TYPE 2 DIABETES MELLITUS WITH HYPERCHOLESTEROLEMIA (HCC): ICD-10-CM

## 2024-09-16 DIAGNOSIS — Z79.4 TYPE 2 DIABETES MELLITUS WITH HYPERGLYCEMIA, WITH LONG-TERM CURRENT USE OF INSULIN (HCC): ICD-10-CM

## 2024-09-16 DIAGNOSIS — E11.69 TYPE 2 DIABETES MELLITUS WITH HYPERCHOLESTEROLEMIA (HCC): ICD-10-CM

## 2024-09-16 DIAGNOSIS — E11.65 TYPE 2 DIABETES MELLITUS WITH HYPERGLYCEMIA, WITH LONG-TERM CURRENT USE OF INSULIN (HCC): ICD-10-CM

## 2024-09-16 DIAGNOSIS — E55.9 VITAMIN D DEFICIENCY: ICD-10-CM

## 2024-09-16 LAB
ALBUMIN SERPL-MCNC: 4.4 G/DL (ref 3.2–4.6)
ALBUMIN/GLOB SERPL: 1.3 (ref 1–1.9)
ALP SERPL-CCNC: 72 U/L (ref 40–129)
ALT SERPL-CCNC: 27 U/L (ref 12–65)
ANION GAP SERPL CALC-SCNC: 9 MMOL/L (ref 9–18)
AST SERPL-CCNC: 22 U/L (ref 15–37)
BASOPHILS # BLD: 0 K/UL (ref 0–0.2)
BASOPHILS NFR BLD: 1 % (ref 0–2)
BILIRUB SERPL-MCNC: 0.3 MG/DL (ref 0–1.2)
BUN SERPL-MCNC: 11 MG/DL (ref 8–23)
CALCIUM SERPL-MCNC: 9.7 MG/DL (ref 8.8–10.2)
CHLORIDE SERPL-SCNC: 102 MMOL/L (ref 98–107)
CHOLEST SERPL-MCNC: 161 MG/DL (ref 0–200)
CO2 SERPL-SCNC: 27 MMOL/L (ref 20–28)
CREAT SERPL-MCNC: 0.7 MG/DL (ref 0.8–1.3)
CREAT UR-MCNC: 116 MG/DL (ref 39–259)
DIFFERENTIAL METHOD BLD: ABNORMAL
EOSINOPHIL # BLD: 0.1 K/UL (ref 0–0.8)
EOSINOPHIL NFR BLD: 2 % (ref 0.5–7.8)
ERYTHROCYTE [DISTWIDTH] IN BLOOD BY AUTOMATED COUNT: 13.1 % (ref 11.9–14.6)
GLOBULIN SER CALC-MCNC: 3.3 G/DL (ref 2.3–3.5)
GLUCOSE SERPL-MCNC: 208 MG/DL (ref 70–99)
HCT VFR BLD AUTO: 52.9 % (ref 41.1–50.3)
HDLC SERPL-MCNC: 61 MG/DL (ref 40–60)
HDLC SERPL: 2.6 (ref 0–5)
HGB BLD-MCNC: 16.9 G/DL (ref 13.6–17.2)
IMM GRANULOCYTES # BLD AUTO: 0 K/UL (ref 0–0.5)
IMM GRANULOCYTES NFR BLD AUTO: 0 % (ref 0–5)
LDLC SERPL CALC-MCNC: 46 MG/DL (ref 0–100)
LYMPHOCYTES # BLD: 1.9 K/UL (ref 0.5–4.6)
LYMPHOCYTES NFR BLD: 30 % (ref 13–44)
MCH RBC QN AUTO: 28.3 PG (ref 26.1–32.9)
MCHC RBC AUTO-ENTMCNC: 31.9 G/DL (ref 31.4–35)
MCV RBC AUTO: 88.5 FL (ref 82–102)
MICROALBUMIN UR-MCNC: 1.59 MG/DL (ref 0–20)
MICROALBUMIN/CREAT UR-RTO: 14 MG/G (ref 0–30)
MONOCYTES # BLD: 0.6 K/UL (ref 0.1–1.3)
MONOCYTES NFR BLD: 10 % (ref 4–12)
NEUTS SEG # BLD: 3.7 K/UL (ref 1.7–8.2)
NEUTS SEG NFR BLD: 57 % (ref 43–78)
NRBC # BLD: 0 K/UL (ref 0–0.2)
PLATELET # BLD AUTO: 284 K/UL (ref 150–450)
PMV BLD AUTO: 9.7 FL (ref 9.4–12.3)
POTASSIUM SERPL-SCNC: 4.9 MMOL/L (ref 3.5–5.1)
PROT SERPL-MCNC: 7.7 G/DL (ref 6.3–8.2)
RBC # BLD AUTO: 5.98 M/UL (ref 4.23–5.6)
SODIUM SERPL-SCNC: 138 MMOL/L (ref 136–145)
TRIGL SERPL-MCNC: 270 MG/DL (ref 0–150)
TSH W FREE THYROID IF ABNORMAL: 2.46 UIU/ML (ref 0.27–4.2)
VLDLC SERPL CALC-MCNC: 54 MG/DL (ref 6–23)
WBC # BLD AUTO: 6.4 K/UL (ref 4.3–11.1)

## 2024-09-17 LAB — 1,25(OH)2D3 SERPL-MCNC: 40.6 PG/ML (ref 24.8–81.5)

## 2024-10-29 ENCOUNTER — TRANSCRIBE ORDERS (OUTPATIENT)
Dept: SCHEDULING | Age: 59
End: 2024-10-29

## 2024-10-29 DIAGNOSIS — M54.50 PAIN, LUMBAR REGION: Primary | ICD-10-CM

## 2025-04-21 DIAGNOSIS — E11.65 TYPE 2 DIABETES MELLITUS WITH HYPERGLYCEMIA, WITH LONG-TERM CURRENT USE OF INSULIN (HCC): ICD-10-CM

## 2025-04-21 DIAGNOSIS — Z79.4 TYPE 2 DIABETES MELLITUS WITH HYPERGLYCEMIA, WITH LONG-TERM CURRENT USE OF INSULIN (HCC): ICD-10-CM

## 2025-04-22 RX ORDER — TIRZEPATIDE 5 MG/.5ML
INJECTION, SOLUTION SUBCUTANEOUS
OUTPATIENT
Start: 2025-04-22

## 2025-05-08 ENCOUNTER — TELEPHONE (OUTPATIENT)
Dept: ENDOCRINOLOGY | Age: 60
End: 2025-05-08

## 2025-05-09 RX ORDER — TIRZEPATIDE 7.5 MG/.5ML
7.5 INJECTION, SOLUTION SUBCUTANEOUS
Qty: 6 ML | Refills: 1 | Status: SHIPPED | OUTPATIENT
Start: 2025-05-09

## 2025-07-15 ENCOUNTER — OFFICE VISIT (OUTPATIENT)
Dept: ENDOCRINOLOGY | Age: 60
End: 2025-07-15

## 2025-07-15 VITALS
OXYGEN SATURATION: 98 % | HEART RATE: 86 BPM | WEIGHT: 237.8 LBS | BODY MASS INDEX: 37.32 KG/M2 | HEIGHT: 67 IN | SYSTOLIC BLOOD PRESSURE: 144 MMHG | DIASTOLIC BLOOD PRESSURE: 90 MMHG

## 2025-07-15 DIAGNOSIS — E11.65 TYPE 2 DIABETES MELLITUS WITH HYPERGLYCEMIA, WITH LONG-TERM CURRENT USE OF INSULIN (HCC): Primary | ICD-10-CM

## 2025-07-15 DIAGNOSIS — Z79.4 TYPE 2 DIABETES MELLITUS WITH HYPERGLYCEMIA, WITH LONG-TERM CURRENT USE OF INSULIN (HCC): Primary | ICD-10-CM

## 2025-07-15 LAB — HBA1C MFR BLD: 12.5 %

## 2025-07-15 PROCEDURE — 3077F SYST BP >= 140 MM HG: CPT | Performed by: NURSE PRACTITIONER

## 2025-07-15 PROCEDURE — 3080F DIAST BP >= 90 MM HG: CPT | Performed by: NURSE PRACTITIONER

## 2025-07-15 PROCEDURE — 83036 HEMOGLOBIN GLYCOSYLATED A1C: CPT | Performed by: NURSE PRACTITIONER

## 2025-07-15 PROCEDURE — 99214 OFFICE O/P EST MOD 30 MIN: CPT | Performed by: NURSE PRACTITIONER

## 2025-07-15 PROCEDURE — 3046F HEMOGLOBIN A1C LEVEL >9.0%: CPT | Performed by: NURSE PRACTITIONER

## 2025-07-15 RX ORDER — TIRZEPATIDE 2.5 MG/.5ML
2.5 INJECTION, SOLUTION SUBCUTANEOUS
Qty: 6 ML | Refills: 1 | Status: SHIPPED | OUTPATIENT
Start: 2025-07-15

## 2025-07-15 RX ORDER — EMPAGLIFLOZIN 25 MG/1
25 TABLET, FILM COATED ORAL DAILY
Qty: 90 TABLET | Refills: 3 | Status: SHIPPED | OUTPATIENT
Start: 2025-07-15

## 2025-07-15 RX ORDER — ACYCLOVIR 400 MG/1
TABLET ORAL
Qty: 9 EACH | Refills: 3 | Status: SHIPPED | OUTPATIENT
Start: 2025-07-15

## 2025-07-15 RX ORDER — INSULIN HUMAN 500 [IU]/ML
INJECTION, SOLUTION SUBCUTANEOUS
Qty: 24 ML | Refills: 3 | Status: SHIPPED | OUTPATIENT
Start: 2025-07-15

## 2025-07-15 ASSESSMENT — ENCOUNTER SYMPTOMS
CONSTIPATION: 0
DIARRHEA: 0

## 2025-07-15 NOTE — ASSESSMENT & PLAN NOTE
1. Type 2 Diabetes Mellitus: Inadequately controlled.  A1c level is significantly elevated at 12.8. Discussed the need to inform the surgeon about the high A1c level, as most surgeries require an A1c of <8 unless it is an emergency. Advised to monitor blood sugar levels before breakfast and dinner. Discussed restarting Jardiance and Mounjaro once insurance is reinstated. Plan to check in on Monday to see if insurance issues are sorted out. Currently taking Humalog U-500 insulin at 40 units in the morning and 40 units in the evening. Insulin dosage will be adjusted to 70 units in the morning and 50 units in the evening. New prescriptions for all medications plus a Dexcom will be sent to the pharmacy.

## 2025-07-15 NOTE — PROGRESS NOTES
Eugenia Perez, APRN-CNP  [facility]  [address], [address]  [place] [address]      NOTICE FOR THE PATIENT: This clinical note is not designed to be interpreted by patients.  We do not recommend reading it unless you have medical training. These notes may contain candid and (unintentionally) offensive descriptions, which are sometimes required for accurate documentation. If you would like more information about your healthcare, please obtain it directly by myself or my staff/colleagues - never solely from the notes. Thank you for your understanding and cooperation.         Justin Munoz is a 60 y.o. male seen today for follow up of type 2 diabetes mellitus.      ASSESSMENT AND PLAN:      Assessment & Plan  1. Type 2 Diabetes Mellitus: Inadequately controlled.  A1c level is significantly elevated at 12.8. Discussed the need to inform the surgeon about the high A1c level, as most surgeries require an A1c of <8 unless it is an emergency. Advised to monitor blood sugar levels before breakfast and dinner. Discussed restarting Jardiance and Mounjaro once insurance is reinstated. Plan to check in on Monday to see if insurance issues are sorted out. Currently taking Humalog U-500 insulin at 40 units in the morning and 40 units in the evening. Insulin dosage will be adjusted to 70 units in the morning and 50 units in the evening. New prescriptions for all medications plus a Dexcom will be sent to the pharmacy.    Follow-up: The patient will follow up in 6 weeks and then again in 3 months.           History of Present Illness:    History of Present Illness  The patient is a 60-year-old male who presents for a follow-up of type 2 diabetes.    The primary reason for this visit is the inability to obtain Jardiance due to the cancellation of his insurance at work. He has been managing his diabetes with Humalog U-500, taking 40 units at breakfast and 40 units at lunch, totaling 80 units per day.

## 2025-07-23 ENCOUNTER — TELEPHONE (OUTPATIENT)
Dept: ENDOCRINOLOGY | Age: 60
End: 2025-07-23

## 2025-07-23 NOTE — TELEPHONE ENCOUNTER
Will you please call patient and make sure his insurance has been reinstated and find out if he was able to  all his medications.     Eugenia Perez, APRN - CNP

## 2025-08-06 ENCOUNTER — TELEPHONE (OUTPATIENT)
Dept: ENDOCRINOLOGY | Age: 60
End: 2025-08-06

## 2025-08-06 DIAGNOSIS — Z79.4 TYPE 2 DIABETES MELLITUS WITH HYPERGLYCEMIA, WITH LONG-TERM CURRENT USE OF INSULIN (HCC): ICD-10-CM

## 2025-08-06 DIAGNOSIS — E11.65 TYPE 2 DIABETES MELLITUS WITH HYPERGLYCEMIA, WITH LONG-TERM CURRENT USE OF INSULIN (HCC): ICD-10-CM

## 2025-08-28 ENCOUNTER — OFFICE VISIT (OUTPATIENT)
Dept: ENDOCRINOLOGY | Age: 60
End: 2025-08-28
Payer: MEDICAID

## 2025-08-28 VITALS
DIASTOLIC BLOOD PRESSURE: 82 MMHG | OXYGEN SATURATION: 98 % | HEART RATE: 88 BPM | BODY MASS INDEX: 37.29 KG/M2 | WEIGHT: 237.6 LBS | SYSTOLIC BLOOD PRESSURE: 138 MMHG | HEIGHT: 67 IN

## 2025-08-28 DIAGNOSIS — E11.65 TYPE 2 DIABETES MELLITUS WITH HYPERGLYCEMIA, WITH LONG-TERM CURRENT USE OF INSULIN (HCC): Primary | ICD-10-CM

## 2025-08-28 DIAGNOSIS — L84 PRE-ULCERATIVE CALLUSES: ICD-10-CM

## 2025-08-28 DIAGNOSIS — E11.65 TYPE 2 DIABETES MELLITUS WITH HYPERGLYCEMIA, WITH LONG-TERM CURRENT USE OF INSULIN (HCC): ICD-10-CM

## 2025-08-28 DIAGNOSIS — Z79.4 TYPE 2 DIABETES MELLITUS WITH HYPERGLYCEMIA, WITH LONG-TERM CURRENT USE OF INSULIN (HCC): Primary | ICD-10-CM

## 2025-08-28 DIAGNOSIS — Z79.4 TYPE 2 DIABETES MELLITUS WITH HYPERGLYCEMIA, WITH LONG-TERM CURRENT USE OF INSULIN (HCC): ICD-10-CM

## 2025-08-28 LAB
ALBUMIN SERPL-MCNC: 4.5 G/DL (ref 3.2–4.6)
ALBUMIN/GLOB SERPL: 1.4 (ref 1–1.9)
ALP SERPL-CCNC: 64 U/L (ref 40–129)
ALT SERPL-CCNC: 27 U/L (ref 8–55)
ANION GAP SERPL CALC-SCNC: 14 MMOL/L (ref 7–16)
AST SERPL-CCNC: 20 U/L (ref 15–37)
BILIRUB SERPL-MCNC: 0.3 MG/DL (ref 0–1.2)
BUN SERPL-MCNC: 12 MG/DL (ref 8–23)
CALCIUM SERPL-MCNC: 10.1 MG/DL (ref 8.8–10.2)
CHLORIDE SERPL-SCNC: 95 MMOL/L (ref 98–107)
CHOLEST SERPL-MCNC: 201 MG/DL (ref 0–200)
CO2 SERPL-SCNC: 26 MMOL/L (ref 20–29)
CREAT SERPL-MCNC: 0.87 MG/DL (ref 0.8–1.3)
CREAT UR-MCNC: 46.9 MG/DL (ref 39–259)
ERYTHROCYTE [DISTWIDTH] IN BLOOD BY AUTOMATED COUNT: 12.9 % (ref 11.9–14.6)
GLOBULIN SER CALC-MCNC: 3.3 G/DL (ref 2.3–3.5)
GLUCOSE SERPL-MCNC: 394 MG/DL (ref 70–99)
HCT VFR BLD AUTO: 48.1 % (ref 41.1–50.3)
HDLC SERPL-MCNC: 55 MG/DL (ref 40–60)
HDLC SERPL: 3.6 (ref 0–5)
HGB BLD-MCNC: 15.6 G/DL (ref 13.6–17.2)
LDLC SERPL CALC-MCNC: 77 MG/DL (ref 0–100)
MCH RBC QN AUTO: 28.4 PG (ref 26.1–32.9)
MCHC RBC AUTO-ENTMCNC: 32.4 G/DL (ref 31.4–35)
MCV RBC AUTO: 87.6 FL (ref 82–102)
MICROALBUMIN UR-MCNC: 1.44 MG/DL (ref 0–20)
MICROALBUMIN/CREAT UR-RTO: 31 MG/G (ref 0–30)
NRBC # BLD: 0 K/UL (ref 0–0.2)
PLATELET # BLD AUTO: 306 K/UL (ref 150–450)
PMV BLD AUTO: 9.8 FL (ref 9.4–12.3)
POTASSIUM SERPL-SCNC: 4.6 MMOL/L (ref 3.5–5.1)
PROT SERPL-MCNC: 7.7 G/DL (ref 6.3–8.2)
RBC # BLD AUTO: 5.49 M/UL (ref 4.23–5.6)
SODIUM SERPL-SCNC: 136 MMOL/L (ref 136–145)
T4 FREE SERPL-MCNC: 1 NG/DL (ref 0.9–1.7)
TRIGL SERPL-MCNC: 345 MG/DL (ref 0–150)
TSH W FREE THYROID IF ABNORMAL: 10.9 UIU/ML (ref 0.27–4.2)
VLDLC SERPL CALC-MCNC: 69 MG/DL (ref 6–23)
WBC # BLD AUTO: 7.6 K/UL (ref 4.3–11.1)

## 2025-08-28 PROCEDURE — 95251 CONT GLUC MNTR ANALYSIS I&R: CPT | Performed by: NURSE PRACTITIONER

## 2025-08-28 PROCEDURE — 3046F HEMOGLOBIN A1C LEVEL >9.0%: CPT | Performed by: NURSE PRACTITIONER

## 2025-08-28 PROCEDURE — 99214 OFFICE O/P EST MOD 30 MIN: CPT | Performed by: NURSE PRACTITIONER

## 2025-08-28 PROCEDURE — 3079F DIAST BP 80-89 MM HG: CPT | Performed by: NURSE PRACTITIONER

## 2025-08-28 PROCEDURE — 3075F SYST BP GE 130 - 139MM HG: CPT | Performed by: NURSE PRACTITIONER

## 2025-08-28 RX ORDER — INSULIN HUMAN 500 [IU]/ML
INJECTION, SOLUTION SUBCUTANEOUS
Qty: 24 ML | Refills: 3 | Status: SHIPPED | OUTPATIENT
Start: 2025-08-28

## 2025-08-28 ASSESSMENT — ENCOUNTER SYMPTOMS
CONSTIPATION: 0
DIARRHEA: 0